# Patient Record
Sex: FEMALE | Race: WHITE | NOT HISPANIC OR LATINO
[De-identification: names, ages, dates, MRNs, and addresses within clinical notes are randomized per-mention and may not be internally consistent; named-entity substitution may affect disease eponyms.]

---

## 2020-04-01 ENCOUNTER — APPOINTMENT (OUTPATIENT)
Dept: SURGERY | Facility: CLINIC | Age: 80
End: 2020-04-01
Payer: MEDICARE

## 2020-04-01 VITALS
BODY MASS INDEX: 24.03 KG/M2 | OXYGEN SATURATION: 97 % | DIASTOLIC BLOOD PRESSURE: 89 MMHG | TEMPERATURE: 98.4 F | HEART RATE: 64 BPM | SYSTOLIC BLOOD PRESSURE: 159 MMHG | HEIGHT: 65.3 IN | WEIGHT: 146 LBS

## 2020-04-01 DIAGNOSIS — Z82.49 FAMILY HISTORY OF ISCHEMIC HEART DISEASE AND OTHER DISEASES OF THE CIRCULATORY SYSTEM: ICD-10-CM

## 2020-04-01 DIAGNOSIS — Z56.0 UNEMPLOYMENT, UNSPECIFIED: ICD-10-CM

## 2020-04-01 DIAGNOSIS — Z72.89 OTHER PROBLEMS RELATED TO LIFESTYLE: ICD-10-CM

## 2020-04-01 DIAGNOSIS — Z83.3 FAMILY HISTORY OF DIABETES MELLITUS: ICD-10-CM

## 2020-04-01 DIAGNOSIS — Z87.891 PERSONAL HISTORY OF NICOTINE DEPENDENCE: ICD-10-CM

## 2020-04-01 DIAGNOSIS — Z82.3 FAMILY HISTORY OF STROKE: ICD-10-CM

## 2020-04-01 PROCEDURE — 99203 OFFICE O/P NEW LOW 30 MIN: CPT

## 2020-04-01 RX ORDER — PSYLLIUM HUSK 0.4 G
CAPSULE ORAL
Refills: 0 | Status: ACTIVE | COMMUNITY

## 2020-04-01 RX ORDER — CETIRIZINE HCL 10 MG
TABLET ORAL
Refills: 0 | Status: ACTIVE | COMMUNITY

## 2020-04-01 RX ORDER — MULTIVITAMIN
TABLET ORAL
Refills: 0 | Status: ACTIVE | COMMUNITY

## 2020-04-01 RX ORDER — VITAMIN B COMPLEX
CAPSULE ORAL
Refills: 0 | Status: ACTIVE | COMMUNITY

## 2020-04-01 RX ORDER — LEVOTHYROXINE SODIUM 0.17 MG/1
TABLET ORAL
Refills: 0 | Status: ACTIVE | COMMUNITY

## 2020-04-01 RX ORDER — ATORVASTATIN CALCIUM 80 MG/1
TABLET, FILM COATED ORAL
Refills: 0 | Status: ACTIVE | COMMUNITY

## 2020-04-01 RX ORDER — PSEUDOEPHEDRINE HCL 30 MG
TABLET ORAL
Refills: 0 | Status: ACTIVE | COMMUNITY

## 2020-04-01 RX ORDER — CALCIUM CARBONATE/VITAMIN D3 600 MG-10
TABLET ORAL
Refills: 0 | Status: ACTIVE | COMMUNITY

## 2020-04-01 RX ORDER — AMLODIPINE BESYLATE 5 MG/1
TABLET ORAL
Refills: 0 | Status: ACTIVE | COMMUNITY

## 2020-04-01 RX ORDER — ASCORBIC ACID 500 MG
TABLET ORAL
Refills: 0 | Status: ACTIVE | COMMUNITY

## 2020-04-01 RX ORDER — ASPIRIN 81 MG
81 TABLET,CHEWABLE ORAL
Refills: 0 | Status: ACTIVE | COMMUNITY

## 2020-04-01 SDOH — ECONOMIC STABILITY - INCOME SECURITY: UNEMPLOYMENT, UNSPECIFIED: Z56.0

## 2020-04-01 NOTE — ASSESSMENT
[FreeTextEntry1] : 80 year old female with prior exlap and recent likely infra-umbilical fungal infection, which is resolved. There is no evidence of infection, hernia or other acute pathology at this time.

## 2020-04-01 NOTE — PHYSICAL EXAM
[Respiratory Effort] : normal respiratory effort [Normal Rate and Rhythm] : normal rate and rhythm [Alert] : alert [Oriented to Person] : oriented to person [Oriented to Place] : oriented to place [Oriented to Time] : oriented to time [Abdominal Masses] : No abdominal masses [Abdomen Tenderness] : ~T ~M No abdominal tenderness [Skin Ulcer] : no ulcer [Skin Induration] : no induration [de-identified] : Well developed, well nourished adult, in NAD. [de-identified] : NC/AT, PERRL, EOMI, sclera anicteric [de-identified] : Soft, nontender, nondistended. No masses, rebound or guarding. Lower midline incision well healed, without erythema or drainage.  No hernias palpable.\par  [de-identified] : Affect appropriate

## 2020-04-01 NOTE — REVIEW OF SYSTEMS
[As Noted in HPI] : as noted in HPI [Negative] : Heme/Lymph [Fever] : no fever [Chills] : no chills [Feeling Poorly] : not feeling poorly [Sore Throat] : no sore throat [Hoarseness] : no hoarseness [Chest Pain] : no chest pain [Palpitations] : no palpitations [Shortness Of Breath] : no shortness of breath [Cough] : no cough [SOB on Exertion] : no shortness of breath during exertion [Abdominal Pain] : no abdominal pain [Vomiting] : no vomiting [Constipation] : no constipation [Diarrhea] : no diarrhea [Dysuria] : no dysuria [Incontinence] : no incontinence [Skin Lesions] : no skin lesions [Skin Wound] : no skin wound [Itching] : no itching

## 2020-04-01 NOTE — HISTORY OF PRESENT ILLNESS
[de-identified] : Patient underwent an emergency exlap for a strangulated ventral hernia in 2012, developed a post-operative wound infection but otherwise recovered well.  She has been in her usual state of good health and has been mindful of over-exerting herself despite remaining active. \par Approximately two weeks ago she noted straw-colored staining on the waist of her underwear, which subsequently became bloody.  She contacted me via telephone, and she described an itchy and red area below her umbilicus, with some foul smell.  She denied fever or chills and no abdominal pain, and otherwise felt well.  I instructed her to clean the area with soap and water and to apply topical antifungal, which she states cleared up the redness, itching and discharge. She has had no further redness, pain, or discoloration or blood. \par She presently feels well, denies pain, fever or chills.  She has been having normal bowel movements and offers no additional complaints at this time. \par

## 2020-04-01 NOTE — PLAN
[FreeTextEntry1] : I instructed patient to call me immediately with any recurrence of symptoms or new symptoms. \par Patient's questions and concerns addressed to her satisfaction. \par

## 2020-09-23 ENCOUNTER — APPOINTMENT (OUTPATIENT)
Dept: SURGERY | Facility: CLINIC | Age: 80
End: 2020-09-23

## 2020-09-30 ENCOUNTER — APPOINTMENT (OUTPATIENT)
Dept: SURGERY | Facility: CLINIC | Age: 80
End: 2020-09-30
Payer: MEDICARE

## 2020-09-30 VITALS
BODY MASS INDEX: 23.82 KG/M2 | HEART RATE: 73 BPM | HEIGHT: 65 IN | DIASTOLIC BLOOD PRESSURE: 74 MMHG | TEMPERATURE: 97.9 F | SYSTOLIC BLOOD PRESSURE: 182 MMHG | WEIGHT: 143 LBS

## 2020-09-30 PROCEDURE — 99213 OFFICE O/P EST LOW 20 MIN: CPT

## 2020-09-30 NOTE — REVIEW OF SYSTEMS
[As Noted in HPI] : as noted in HPI [Negative] : Heme/Lymph [Fever] : no fever [Chills] : no chills [Feeling Poorly] : not feeling poorly [Feeling Tired] : not feeling tired [Sore Throat] : no sore throat [Hoarseness] : no hoarseness [Chest Pain] : no chest pain [Palpitations] : no palpitations [Shortness Of Breath] : no shortness of breath [Cough] : no cough [SOB on Exertion] : no shortness of breath during exertion [Abdominal Pain] : no abdominal pain [Vomiting] : no vomiting [Constipation] : no constipation [Diarrhea] : no diarrhea [Dysuria] : no dysuria [Incontinence] : no incontinence [Skin Lesions] : no skin lesions [Skin Wound] : no skin wound [Itching] : no itching

## 2020-09-30 NOTE — HISTORY OF PRESENT ILLNESS
[de-identified] : Patient underwent an emergency exlap for a strangulated ventral hernia in 2012, developed a post-operative wound infection but otherwise recovered well.  She has been in her usual state of good health and has been mindful of over-exerting herself despite remaining active. \par She is here today because in June, she again noted a small amount of straw-colored staining on the waist of her underwear, which resolved after one day.  She denied fever or chills and no abdominal pain, and otherwise felt well.  She again experienced a similar episode in September, lasting one day, and cleared by the next morning.  She states she has had no further redness, itching and discharge. She has had no further redness, pain, or discoloration or blood. \par She presently feels well, denies pain, fever or chills.  She has been having normal bowel movements and offers no additional complaints at this time. \par  [de-identified] : Patient reports no interval changes to medications, medical history or overall health status.\par

## 2020-09-30 NOTE — ASSESSMENT
[FreeTextEntry1] : Likely recurrence of self-limited fungal infection, similar to what she had in the past.  There is no evidence of infection of any kind presently. \par

## 2020-09-30 NOTE — PHYSICAL EXAM
[Respiratory Effort] : normal respiratory effort [Normal Rate and Rhythm] : normal rate and rhythm [Alert] : alert [Oriented to Person] : oriented to person [Oriented to Place] : oriented to place [Oriented to Time] : oriented to time [Abdominal Masses] : No abdominal masses [Abdomen Tenderness] : ~T ~M No abdominal tenderness [Skin Ulcer] : no ulcer [Skin Induration] : no induration [de-identified] : Well developed, well nourished adult, in NAD. [de-identified] : NC/AT, PERRL, EOMI, sclera anicteric [de-identified] : Soft, nontender, nondistended. No masses, rebound or guarding. Lower midline incision well healed, without erythema or drainage.  No hernias palpable.  Umbilicus without erythema, drainage or skin changes.  \par  [de-identified] : Affect appropriate

## 2021-07-08 ENCOUNTER — INPATIENT (INPATIENT)
Facility: HOSPITAL | Age: 81
LOS: 3 days | Discharge: HOME | DRG: 440 | End: 2021-07-12
Attending: HOSPITALIST | Admitting: HOSPITALIST
Payer: MEDICARE

## 2021-07-08 VITALS
RESPIRATION RATE: 18 BRPM | TEMPERATURE: 98 F | HEART RATE: 66 BPM | WEIGHT: 145.51 LBS | OXYGEN SATURATION: 98 % | DIASTOLIC BLOOD PRESSURE: 78 MMHG | SYSTOLIC BLOOD PRESSURE: 157 MMHG

## 2021-07-08 DIAGNOSIS — K85.90 ACUTE PANCREATITIS WITHOUT NECROSIS OR INFECTION, UNSPECIFIED: ICD-10-CM

## 2021-07-08 LAB
ALBUMIN SERPL ELPH-MCNC: 4.3 G/DL — SIGNIFICANT CHANGE UP (ref 3.5–5)
ALP SERPL-CCNC: 111 U/L — SIGNIFICANT CHANGE UP (ref 40–120)
ALT FLD-CCNC: 21 U/L DA — SIGNIFICANT CHANGE UP (ref 10–60)
ANION GAP SERPL CALC-SCNC: 6 MMOL/L — SIGNIFICANT CHANGE UP (ref 5–17)
APPEARANCE UR: CLEAR — SIGNIFICANT CHANGE UP
AST SERPL-CCNC: 34 U/L — SIGNIFICANT CHANGE UP (ref 10–40)
BASOPHILS # BLD AUTO: 0.07 K/UL — SIGNIFICANT CHANGE UP (ref 0–0.2)
BASOPHILS NFR BLD AUTO: 0.7 % — SIGNIFICANT CHANGE UP (ref 0–2)
BILIRUB SERPL-MCNC: 0.6 MG/DL — SIGNIFICANT CHANGE UP (ref 0.2–1.2)
BILIRUB UR-MCNC: NEGATIVE — SIGNIFICANT CHANGE UP
BUN SERPL-MCNC: 23 MG/DL — HIGH (ref 7–18)
CALCIUM SERPL-MCNC: 9.5 MG/DL — SIGNIFICANT CHANGE UP (ref 8.4–10.5)
CHLORIDE SERPL-SCNC: 106 MMOL/L — SIGNIFICANT CHANGE UP (ref 96–108)
CO2 SERPL-SCNC: 26 MMOL/L — SIGNIFICANT CHANGE UP (ref 22–31)
COLOR SPEC: YELLOW — SIGNIFICANT CHANGE UP
CREAT SERPL-MCNC: 1.09 MG/DL — SIGNIFICANT CHANGE UP (ref 0.5–1.3)
DIFF PNL FLD: ABNORMAL
EOSINOPHIL # BLD AUTO: 0.06 K/UL — SIGNIFICANT CHANGE UP (ref 0–0.5)
EOSINOPHIL NFR BLD AUTO: 0.6 % — SIGNIFICANT CHANGE UP (ref 0–6)
ETHANOL SERPL-MCNC: 5 MG/DL — SIGNIFICANT CHANGE UP (ref 0–10)
GLUCOSE SERPL-MCNC: 150 MG/DL — HIGH (ref 70–99)
GLUCOSE UR QL: NEGATIVE — SIGNIFICANT CHANGE UP
HCT VFR BLD CALC: 42.8 % — SIGNIFICANT CHANGE UP (ref 34.5–45)
HGB BLD-MCNC: 14 G/DL — SIGNIFICANT CHANGE UP (ref 11.5–15.5)
IMM GRANULOCYTES NFR BLD AUTO: 0.5 % — SIGNIFICANT CHANGE UP (ref 0–1.5)
KETONES UR-MCNC: ABNORMAL
LACTATE SERPL-SCNC: 1 MMOL/L — SIGNIFICANT CHANGE UP (ref 0.7–2)
LEUKOCYTE ESTERASE UR-ACNC: ABNORMAL
LIDOCAIN IGE QN: 3747 U/L — HIGH (ref 73–393)
LYMPHOCYTES # BLD AUTO: 0.9 K/UL — LOW (ref 1–3.3)
LYMPHOCYTES # BLD AUTO: 8.4 % — LOW (ref 13–44)
MCHC RBC-ENTMCNC: 30 PG — SIGNIFICANT CHANGE UP (ref 27–34)
MCHC RBC-ENTMCNC: 32.7 GM/DL — SIGNIFICANT CHANGE UP (ref 32–36)
MCV RBC AUTO: 91.8 FL — SIGNIFICANT CHANGE UP (ref 80–100)
MONOCYTES # BLD AUTO: 0.39 K/UL — SIGNIFICANT CHANGE UP (ref 0–0.9)
MONOCYTES NFR BLD AUTO: 3.7 % — SIGNIFICANT CHANGE UP (ref 2–14)
NEUTROPHILS # BLD AUTO: 9.21 K/UL — HIGH (ref 1.8–7.4)
NEUTROPHILS NFR BLD AUTO: 86.1 % — HIGH (ref 43–77)
NITRITE UR-MCNC: NEGATIVE — SIGNIFICANT CHANGE UP
NRBC # BLD: 0 /100 WBCS — SIGNIFICANT CHANGE UP (ref 0–0)
PH UR: 6 — SIGNIFICANT CHANGE UP (ref 5–8)
PLATELET # BLD AUTO: 236 K/UL — SIGNIFICANT CHANGE UP (ref 150–400)
POTASSIUM SERPL-MCNC: 4.3 MMOL/L — SIGNIFICANT CHANGE UP (ref 3.5–5.3)
POTASSIUM SERPL-SCNC: 4.3 MMOL/L — SIGNIFICANT CHANGE UP (ref 3.5–5.3)
PROT SERPL-MCNC: 8.3 G/DL — SIGNIFICANT CHANGE UP (ref 6–8.3)
PROT UR-MCNC: 100
RBC # BLD: 4.66 M/UL — SIGNIFICANT CHANGE UP (ref 3.8–5.2)
RBC # FLD: 12.3 % — SIGNIFICANT CHANGE UP (ref 10.3–14.5)
SODIUM SERPL-SCNC: 138 MMOL/L — SIGNIFICANT CHANGE UP (ref 135–145)
SP GR SPEC: 1.02 — SIGNIFICANT CHANGE UP (ref 1.01–1.02)
TRIGL SERPL-MCNC: 74 MG/DL — SIGNIFICANT CHANGE UP
TROPONIN I SERPL-MCNC: <0.015 NG/ML — SIGNIFICANT CHANGE UP (ref 0–0.04)
UROBILINOGEN FLD QL: NEGATIVE — SIGNIFICANT CHANGE UP
WBC # BLD: 10.68 K/UL — HIGH (ref 3.8–10.5)
WBC # FLD AUTO: 10.68 K/UL — HIGH (ref 3.8–10.5)

## 2021-07-08 PROCEDURE — 99285 EMERGENCY DEPT VISIT HI MDM: CPT

## 2021-07-08 PROCEDURE — 76705 ECHO EXAM OF ABDOMEN: CPT | Mod: 26

## 2021-07-08 PROCEDURE — 93010 ELECTROCARDIOGRAM REPORT: CPT

## 2021-07-08 PROCEDURE — 99222 1ST HOSP IP/OBS MODERATE 55: CPT

## 2021-07-08 RX ORDER — ONDANSETRON 8 MG/1
4 TABLET, FILM COATED ORAL ONCE
Refills: 0 | Status: COMPLETED | OUTPATIENT
Start: 2021-07-08 | End: 2021-07-08

## 2021-07-08 RX ORDER — MORPHINE SULFATE 50 MG/1
4 CAPSULE, EXTENDED RELEASE ORAL ONCE
Refills: 0 | Status: DISCONTINUED | OUTPATIENT
Start: 2021-07-08 | End: 2021-07-08

## 2021-07-08 RX ORDER — SODIUM CHLORIDE 9 MG/ML
1000 INJECTION INTRAMUSCULAR; INTRAVENOUS; SUBCUTANEOUS ONCE
Refills: 0 | Status: COMPLETED | OUTPATIENT
Start: 2021-07-08 | End: 2021-07-08

## 2021-07-08 RX ADMIN — SODIUM CHLORIDE 1000 MILLILITER(S): 9 INJECTION INTRAMUSCULAR; INTRAVENOUS; SUBCUTANEOUS at 21:08

## 2021-07-08 RX ADMIN — SODIUM CHLORIDE 1000 MILLILITER(S): 9 INJECTION INTRAMUSCULAR; INTRAVENOUS; SUBCUTANEOUS at 20:43

## 2021-07-08 RX ADMIN — ONDANSETRON 4 MILLIGRAM(S): 8 TABLET, FILM COATED ORAL at 18:11

## 2021-07-08 RX ADMIN — MORPHINE SULFATE 4 MILLIGRAM(S): 50 CAPSULE, EXTENDED RELEASE ORAL at 18:11

## 2021-07-08 RX ADMIN — MORPHINE SULFATE 4 MILLIGRAM(S): 50 CAPSULE, EXTENDED RELEASE ORAL at 19:36

## 2021-07-08 RX ADMIN — MORPHINE SULFATE 4 MILLIGRAM(S): 50 CAPSULE, EXTENDED RELEASE ORAL at 21:08

## 2021-07-08 NOTE — H&P ADULT - MUSCULOSKELETAL
normal/ROM intact/no joint swelling/no joint erythema/no joint warmth/no calf tenderness/normal strength detailed exam

## 2021-07-08 NOTE — ED PROVIDER NOTE - CLINICAL SUMMARY MEDICAL DECISION MAKING FREE TEXT BOX
82 y/o woman, h/o abdominal hernia repair, c/o diffuse abdominal pain since 4 am today, with vomiting once.  No fever/chills/diarrhea/constipation/dysuria--Labs, IVF, pain control, ultrasound, reassess.

## 2021-07-08 NOTE — H&P ADULT - ASSESSMENT
Patient is a 81 year old, ambulates independently, AAOx3, from home, w/ PMH of CVA, HTN, hypothyroidism, and MGUS, p/w abdominal pain x2d. Admitted for acute pancreatitis

## 2021-07-08 NOTE — H&P ADULT - ATTENDING COMMENTS
Pt seen and examined.  Case discussed with MAR.  81 year old woman with 1 day of nausea, vomiting and epigastric pain.    Vital Signs Last 24 Hrs  T(C): 36.1 (2021 19:41), Max: 36.6 (2021 16:18)  T(F): 97 (2021 19:41), Max: 97.8 (2021 16:18)  HR: 59 (2021 19:41) (59 - 66)  BP: 183/78 (2021 19:41) (157/78 - 183/78)  RR: 18 (2021 19:41) (18 - 18)  SpO2: 96% (2021 19:41) (96% - 98%)      Labs                        14.0   10.68 )-----------( 236      ( 2021 17:24 )             42.8     07-08    138  |  106  |  23<H>  ----------------------------<  150<H>  4.3   |  26  |  1.09    Ca    9.5      2021 17:24  TPro  8.3  /  Alb  4.3  /  TBili  0.6  /  DBili  x   /  AST  34  /  ALT  21  /  AlkPhos  111  07-08    Lipase - 3747    Urinalysis Basic - ( 2021 20:56 )    Color: Yellow / Appearance: Clear / S.020 / pH: x  Gluc: x / Ketone: Small  / Bili: Negative / Urobili: Negative   Blood: x / Protein: 100 / Nitrite: Negative   Leuk Esterase: Moderate / RBC: 5-10 /HPF / WBC 26-50 /HPF   Sq Epi: x / Non Sq Epi: Few /HPF / Bacteria: Moderate /HPF    RUQ U/S - unremarkable    Impression  - Acute uncomplicated pancreatitis  - UTI    - Plan  Admit to Medicine   Pain control   Aggressive IVF hydration with isotonic fluids- lactated ringer's  Keep NPO  Monitor chemistry   Empiric IV antibiotics with ceftriaxone 1g daily; check urine culture Pt seen and examined.  Case discussed with MAR.  81 year old woman with 1 day of nausea, vomiting and epigastric pain.    Vital Signs Last 24 Hrs  T(C): 36.1 (2021 19:41), Max: 36.6 (2021 16:18)  T(F): 97 (2021 19:41), Max: 97.8 (2021 16:18)  HR: 59 (2021 19:41) (59 - 66)  BP: 183/78 (2021 19:41) (157/78 - 183/78)  RR: 18 (2021 19:41) (18 - 18)  SpO2: 96% (2021 19:41) (96% - 98%)    Exam   as above    Labs                        14.0   10.68 )-----------( 236      ( 2021 17:24 )             42.8     07-    138  |  106  |  23<H>  ----------------------------<  150<H>  4.3   |  26  |  1.09    Ca    9.5      2021 17:24  TPro  8.3  /  Alb  4.3  /  TBili  0.6  /  DBili  x   /  AST  34  /  ALT  21  /  AlkPhos  111  07-08    Lipase - 3747    Urinalysis Basic - ( 2021 20:56 )    Color: Yellow / Appearance: Clear / S.020 / pH: x  Gluc: x / Ketone: Small  / Bili: Negative / Urobili: Negative   Blood: x / Protein: 100 / Nitrite: Negative   Leuk Esterase: Moderate / RBC: 5-10 /HPF / WBC 26-50 /HPF   Sq Epi: x / Non Sq Epi: Few /HPF / Bacteria: Moderate /HPF    RUQ U/S - unremarkable    Impression  - Acute uncomplicated pancreatitis  - Asymptomatic bacteruria    - Plan  Admit to Medicine   Pain control   Aggressive IVF hydration with isotonic fluids- lactated ringer's  Keep NPO  Monitor chemistry

## 2021-07-08 NOTE — H&P ADULT - PROBLEM SELECTOR PLAN 1
- p/w abdominal pain  - possible due to Amlodipine or UTI  - VS wnl  - WBC 10.7  - LFTs normal  - tG  - lipase 3747  - US abd no signs of acute cholecystitis  - s/p 1L LR  - c/w NPO, IVF, pain management  - f/u tG, lipid panel - p/w abdominal pain  - possible due to Amlodipine vs UTI  - VS wnl  - WBC 10.7  - LFTs normal  - tG  - lipase 3747  - US abd no signs of acute cholecystitis  - s/p 1L LR  - c/w NPO, IVF, pain management  - f/u tG, lipid panel

## 2021-07-08 NOTE — ED ADULT NURSE NOTE - OBJECTIVE STATEMENT
pt is here for abdominal pain.  pt stated that "too much gas in stomach", abdominal pain for 1 day, denied fever or chills, c/o nausea, denied chest pain or sob, no distress noted at this time.

## 2021-07-08 NOTE — H&P ADULT - NSICDXPASTMEDICALHX_GEN_ALL_CORE_FT
PAST MEDICAL HISTORY:  Cerebrovascular accident (CVA)     HTN (hypertension)     Hypothyroidism

## 2021-07-08 NOTE — H&P ADULT - PROBLEM SELECTOR PLAN 2
- noted to have positive U/A  - asxs  - c/w ceftriaxone  - f/u Ucx, Bcx - noted to have positive U/A  - asxs  - hold abx  - f/u Ucx, Bcx

## 2021-07-08 NOTE — H&P ADULT - PROBLEM SELECTOR PLAN 4
- h/o HTN, on amlodipine 5mg and lisinopril 40mg at home  - c/w lisinopril 40mg qd  - hold amlodipine given possible etiology of pancreatitis

## 2021-07-08 NOTE — ED PROVIDER NOTE - OBJECTIVE STATEMENT
80 y/o woman, h/o abdominal hernia repair, c/o diffuse abdominal pain since 4 am today, with vomiting once.  No fever/chills/diarrhea/constipation/dysuria.  She admits to drinking alcohol occasionally, socially, but denies heavy or frequent consumption.

## 2021-07-08 NOTE — H&P ADULT - HISTORY OF PRESENT ILLNESS
Patient is a 81 year old, ambulates independently, AAOx3, from home, w/ PMH of CVA, HTN, hypothyroidism, and MGUS, p/w abdominal pain x2d. Initially she felt increased abdominal pressure from gas, but it developed to sharp, intermittent, 3/10, non-radiating, mid-epigastric abdominal pain, which worsened with oral intake. Patient endorses nausea and NBNB vomiting x4-5x. She denies fever, chills, chest pain, sob, urinary or bowel movement changes.

## 2021-07-09 ENCOUNTER — TRANSCRIPTION ENCOUNTER (OUTPATIENT)
Age: 81
End: 2021-07-09

## 2021-07-09 DIAGNOSIS — N39.0 URINARY TRACT INFECTION, SITE NOT SPECIFIED: ICD-10-CM

## 2021-07-09 DIAGNOSIS — I63.9 CEREBRAL INFARCTION, UNSPECIFIED: ICD-10-CM

## 2021-07-09 DIAGNOSIS — E03.9 HYPOTHYROIDISM, UNSPECIFIED: ICD-10-CM

## 2021-07-09 DIAGNOSIS — K85.90 ACUTE PANCREATITIS WITHOUT NECROSIS OR INFECTION, UNSPECIFIED: ICD-10-CM

## 2021-07-09 DIAGNOSIS — Z98.890 OTHER SPECIFIED POSTPROCEDURAL STATES: Chronic | ICD-10-CM

## 2021-07-09 DIAGNOSIS — D47.2 MONOCLONAL GAMMOPATHY: ICD-10-CM

## 2021-07-09 DIAGNOSIS — Z29.9 ENCOUNTER FOR PROPHYLACTIC MEASURES, UNSPECIFIED: ICD-10-CM

## 2021-07-09 DIAGNOSIS — R82.71 BACTERIURIA: ICD-10-CM

## 2021-07-09 DIAGNOSIS — I10 ESSENTIAL (PRIMARY) HYPERTENSION: ICD-10-CM

## 2021-07-09 LAB
A1C WITH ESTIMATED AVERAGE GLUCOSE RESULT: 5.5 % — SIGNIFICANT CHANGE UP (ref 4–5.6)
ALBUMIN SERPL ELPH-MCNC: 3.9 G/DL — SIGNIFICANT CHANGE UP (ref 3.5–5)
ALP SERPL-CCNC: 99 U/L — SIGNIFICANT CHANGE UP (ref 40–120)
ALT FLD-CCNC: 20 U/L DA — SIGNIFICANT CHANGE UP (ref 10–60)
ANION GAP SERPL CALC-SCNC: 5 MMOL/L — SIGNIFICANT CHANGE UP (ref 5–17)
AST SERPL-CCNC: 26 U/L — SIGNIFICANT CHANGE UP (ref 10–40)
BASOPHILS # BLD AUTO: 0.03 K/UL — SIGNIFICANT CHANGE UP (ref 0–0.2)
BASOPHILS NFR BLD AUTO: 0.4 % — SIGNIFICANT CHANGE UP (ref 0–2)
BILIRUB SERPL-MCNC: 0.5 MG/DL — SIGNIFICANT CHANGE UP (ref 0.2–1.2)
BUN SERPL-MCNC: 26 MG/DL — HIGH (ref 7–18)
CALCIUM SERPL-MCNC: 8.6 MG/DL — SIGNIFICANT CHANGE UP (ref 8.4–10.5)
CHLORIDE SERPL-SCNC: 107 MMOL/L — SIGNIFICANT CHANGE UP (ref 96–108)
CHOLEST SERPL-MCNC: 139 MG/DL — SIGNIFICANT CHANGE UP
CO2 SERPL-SCNC: 32 MMOL/L — HIGH (ref 22–31)
CREAT SERPL-MCNC: 1.21 MG/DL — SIGNIFICANT CHANGE UP (ref 0.5–1.3)
EOSINOPHIL # BLD AUTO: 0.01 K/UL — SIGNIFICANT CHANGE UP (ref 0–0.5)
EOSINOPHIL NFR BLD AUTO: 0.1 % — SIGNIFICANT CHANGE UP (ref 0–6)
ESTIMATED AVERAGE GLUCOSE: 111 MG/DL — SIGNIFICANT CHANGE UP (ref 68–114)
GLUCOSE SERPL-MCNC: 146 MG/DL — HIGH (ref 70–99)
HCT VFR BLD CALC: 38.8 % — SIGNIFICANT CHANGE UP (ref 34.5–45)
HDLC SERPL-MCNC: 70 MG/DL — SIGNIFICANT CHANGE UP
HGB BLD-MCNC: 12.4 G/DL — SIGNIFICANT CHANGE UP (ref 11.5–15.5)
IMM GRANULOCYTES NFR BLD AUTO: 1 % — SIGNIFICANT CHANGE UP (ref 0–1.5)
LIPID PNL WITH DIRECT LDL SERPL: 57 MG/DL — SIGNIFICANT CHANGE UP
LYMPHOCYTES # BLD AUTO: 0.6 K/UL — LOW (ref 1–3.3)
LYMPHOCYTES # BLD AUTO: 7.5 % — LOW (ref 13–44)
MAGNESIUM SERPL-MCNC: 2 MG/DL — SIGNIFICANT CHANGE UP (ref 1.6–2.6)
MCHC RBC-ENTMCNC: 30 PG — SIGNIFICANT CHANGE UP (ref 27–34)
MCHC RBC-ENTMCNC: 32 GM/DL — SIGNIFICANT CHANGE UP (ref 32–36)
MCV RBC AUTO: 93.7 FL — SIGNIFICANT CHANGE UP (ref 80–100)
MONOCYTES # BLD AUTO: 0.5 K/UL — SIGNIFICANT CHANGE UP (ref 0–0.9)
MONOCYTES NFR BLD AUTO: 6.3 % — SIGNIFICANT CHANGE UP (ref 2–14)
NEUTROPHILS # BLD AUTO: 6.77 K/UL — SIGNIFICANT CHANGE UP (ref 1.8–7.4)
NEUTROPHILS NFR BLD AUTO: 84.7 % — HIGH (ref 43–77)
NON HDL CHOLESTEROL: 69 MG/DL — SIGNIFICANT CHANGE UP
NRBC # BLD: 0 /100 WBCS — SIGNIFICANT CHANGE UP (ref 0–0)
PHOSPHATE SERPL-MCNC: 5.9 MG/DL — HIGH (ref 2.5–4.5)
PLATELET # BLD AUTO: 203 K/UL — SIGNIFICANT CHANGE UP (ref 150–400)
POTASSIUM SERPL-MCNC: 3.8 MMOL/L — SIGNIFICANT CHANGE UP (ref 3.5–5.3)
POTASSIUM SERPL-SCNC: 3.8 MMOL/L — SIGNIFICANT CHANGE UP (ref 3.5–5.3)
PROT SERPL-MCNC: 7.5 G/DL — SIGNIFICANT CHANGE UP (ref 6–8.3)
RBC # BLD: 4.14 M/UL — SIGNIFICANT CHANGE UP (ref 3.8–5.2)
RBC # FLD: 12.5 % — SIGNIFICANT CHANGE UP (ref 10.3–14.5)
SARS-COV-2 RNA SPEC QL NAA+PROBE: SIGNIFICANT CHANGE UP
SODIUM SERPL-SCNC: 144 MMOL/L — SIGNIFICANT CHANGE UP (ref 135–145)
TRIGL SERPL-MCNC: 60 MG/DL — SIGNIFICANT CHANGE UP
TSH SERPL-MCNC: 0.73 UU/ML — SIGNIFICANT CHANGE UP (ref 0.34–4.82)
WBC # BLD: 7.99 K/UL — SIGNIFICANT CHANGE UP (ref 3.8–10.5)
WBC # FLD AUTO: 7.99 K/UL — SIGNIFICANT CHANGE UP (ref 3.8–10.5)

## 2021-07-09 PROCEDURE — 99233 SBSQ HOSP IP/OBS HIGH 50: CPT | Mod: GC

## 2021-07-09 RX ORDER — ONDANSETRON 8 MG/1
4 TABLET, FILM COATED ORAL EVERY 8 HOURS
Refills: 0 | Status: DISCONTINUED | OUTPATIENT
Start: 2021-07-09 | End: 2021-07-12

## 2021-07-09 RX ORDER — AMLODIPINE BESYLATE 2.5 MG/1
1 TABLET ORAL
Qty: 0 | Refills: 0 | DISCHARGE

## 2021-07-09 RX ORDER — HYDROMORPHONE HYDROCHLORIDE 2 MG/ML
1 INJECTION INTRAMUSCULAR; INTRAVENOUS; SUBCUTANEOUS
Refills: 0 | Status: DISCONTINUED | OUTPATIENT
Start: 2021-07-09 | End: 2021-07-10

## 2021-07-09 RX ORDER — PANTOPRAZOLE SODIUM 20 MG/1
40 TABLET, DELAYED RELEASE ORAL DAILY
Refills: 0 | Status: DISCONTINUED | OUTPATIENT
Start: 2021-07-09 | End: 2021-07-11

## 2021-07-09 RX ORDER — AMLODIPINE BESYLATE 2.5 MG/1
5 TABLET ORAL ONCE
Refills: 0 | Status: COMPLETED | OUTPATIENT
Start: 2021-07-09 | End: 2021-07-09

## 2021-07-09 RX ORDER — ASPIRIN/CALCIUM CARB/MAGNESIUM 324 MG
1 TABLET ORAL
Qty: 0 | Refills: 0 | DISCHARGE

## 2021-07-09 RX ORDER — ATORVASTATIN CALCIUM 80 MG/1
1 TABLET, FILM COATED ORAL
Qty: 0 | Refills: 0 | DISCHARGE

## 2021-07-09 RX ORDER — LISINOPRIL 2.5 MG/1
1 TABLET ORAL
Qty: 0 | Refills: 0 | DISCHARGE

## 2021-07-09 RX ORDER — ONDANSETRON 8 MG/1
4 TABLET, FILM COATED ORAL ONCE
Refills: 0 | Status: COMPLETED | OUTPATIENT
Start: 2021-07-09 | End: 2021-07-09

## 2021-07-09 RX ORDER — SODIUM CHLORIDE 9 MG/ML
1000 INJECTION, SOLUTION INTRAVENOUS
Refills: 0 | Status: DISCONTINUED | OUTPATIENT
Start: 2021-07-09 | End: 2021-07-10

## 2021-07-09 RX ORDER — LEVOTHYROXINE SODIUM 125 MCG
1 TABLET ORAL
Qty: 0 | Refills: 0 | DISCHARGE

## 2021-07-09 RX ORDER — KETOROLAC TROMETHAMINE 30 MG/ML
30 SYRINGE (ML) INJECTION EVERY 6 HOURS
Refills: 0 | Status: DISCONTINUED | OUTPATIENT
Start: 2021-07-09 | End: 2021-07-12

## 2021-07-09 RX ORDER — ENOXAPARIN SODIUM 100 MG/ML
40 INJECTION SUBCUTANEOUS ONCE
Refills: 0 | Status: COMPLETED | OUTPATIENT
Start: 2021-07-09 | End: 2021-07-09

## 2021-07-09 RX ADMIN — Medication 30 MILLIGRAM(S): at 02:42

## 2021-07-09 RX ADMIN — SODIUM CHLORIDE 200 MILLILITER(S): 9 INJECTION, SOLUTION INTRAVENOUS at 21:13

## 2021-07-09 RX ADMIN — AMLODIPINE BESYLATE 5 MILLIGRAM(S): 2.5 TABLET ORAL at 19:03

## 2021-07-09 RX ADMIN — ONDANSETRON 4 MILLIGRAM(S): 8 TABLET, FILM COATED ORAL at 03:50

## 2021-07-09 RX ADMIN — MORPHINE SULFATE 4 MILLIGRAM(S): 50 CAPSULE, EXTENDED RELEASE ORAL at 02:42

## 2021-07-09 RX ADMIN — Medication 30 MILLIGRAM(S): at 02:41

## 2021-07-09 RX ADMIN — HYDROMORPHONE HYDROCHLORIDE 1 MILLIGRAM(S): 2 INJECTION INTRAMUSCULAR; INTRAVENOUS; SUBCUTANEOUS at 02:41

## 2021-07-09 RX ADMIN — ONDANSETRON 4 MILLIGRAM(S): 8 TABLET, FILM COATED ORAL at 11:43

## 2021-07-09 RX ADMIN — PANTOPRAZOLE SODIUM 40 MILLIGRAM(S): 20 TABLET, DELAYED RELEASE ORAL at 11:37

## 2021-07-09 RX ADMIN — HYDROMORPHONE HYDROCHLORIDE 1 MILLIGRAM(S): 2 INJECTION INTRAMUSCULAR; INTRAVENOUS; SUBCUTANEOUS at 02:42

## 2021-07-09 RX ADMIN — ENOXAPARIN SODIUM 40 MILLIGRAM(S): 100 INJECTION SUBCUTANEOUS at 11:37

## 2021-07-09 RX ADMIN — SODIUM CHLORIDE 200 MILLILITER(S): 9 INJECTION, SOLUTION INTRAVENOUS at 04:51

## 2021-07-09 NOTE — PROGRESS NOTE ADULT - PROBLEM SELECTOR PLAN 5
- h/o hypothyroidism, on synthroid 100mcg at home  - c/w home med  - f/u TSH - h/o hypothyroidism, on synthroid 100mcg at home  - c/w home med  - TSH 0.73

## 2021-07-09 NOTE — PROGRESS NOTE ADULT - PROBLEM SELECTOR PLAN 1
- p/w abdominal pain  - possible due to Amlodipine vs UTI  - VS wnl  - WBC 10.7  - LFTs normal  - tG  - lipase 3747  - US abd no signs of acute cholecystitis  - s/p 1L LR  - c/w NPO, IVF, pain management  - f/u tG, lipid panel - p/w epigastric pain  - Idiopathic   - VS wnl  - WBC 10.7 -> 7.99  - LFTs normal  - tG 74 (normal)  - Elevated lipase 3747  - US abd no signs of acute cholecystitis  - s/p 1L LR  - c/w IVF, pain management  -Zofran 4mg IV Push PRN  - Protonix 40 IV qday  - Advance diet as tolerated if pt doesn't have nausea

## 2021-07-09 NOTE — PROGRESS NOTE ADULT - SUBJECTIVE AND OBJECTIVE BOX
PGY-1 Progress Note discussed with attending    PAGER #: [[phone]] TILL 5:00 PM  PLEASE CONTACT ON CALL TEAM:  - On Call Team (Please refer to Felecia) FROM 5:00 PM - 8:30PM  - Nightfloat Team FROM 8:30 -7:30 AM    CHIEF COMPLAINT & BRIEF HOSPITAL COURSE:    INTERVAL HPI/OVERNIGHT EVENTS:       REVIEW OF SYSTEMS:  CONSTITUTIONAL: No fever, weight loss, or fatigue  RESPIRATORY: No cough, wheezing, chills or hemoptysis; No shortness of breath  CARDIOVASCULAR: No chest pain, palpitations, dizziness, or leg swelling  GASTROINTESTINAL: No abdominal pain. No nausea, vomiting, or hematemesis; No diarrhea or constipation. No melena or hematochezia.  GENITOURINARY: No dysuria or hematuria, urinary frequency  NEUROLOGICAL: No headaches, memory loss, loss of strength, numbness, or tremors  SKIN: No itching, burning, rashes, or lesions     Vital Signs Last 24 Hrs  T(C): 36.5 (09 Jul 2021 04:51), Max: 37.2 (09 Jul 2021 00:03)  T(F): 97.7 (09 Jul 2021 04:51), Max: 98.9 (09 Jul 2021 00:03)  HR: 77 (09 Jul 2021 04:51) (59 - 77)  BP: 157/85 (09 Jul 2021 04:51) (157/78 - 183/78)  BP(mean): --  RR: 18 (09 Jul 2021 04:51) (18 - 18)  SpO2: 95% (09 Jul 2021 04:51) (95% - 98%)    PHYSICAL EXAMINATION:  GENERAL: NAD, well built  HEAD:  Atraumatic, Normocephalic  EYES:  conjunctiva and sclera clear  NECK: Supple, No JVD, Normal thyroid  CHEST/LUNG: Clear to auscultation. Clear to percussion bilaterally; No rales, rhonchi, wheezing, or rubs  HEART: Regular rate and rhythm; No murmurs, rubs, or gallops  ABDOMEN: Soft, Nontender, Nondistended; Bowel sounds present  NERVOUS SYSTEM:  Alert & Oriented X3,    EXTREMITIES:  2+ Peripheral Pulses, No clubbing, cyanosis, or edema  SKIN: warm dry                          12.4   7.99  )-----------( 203      ( 09 Jul 2021 06:26 )             38.8     07-09    144  |  107  |  26<H>  ----------------------------<  146<H>  3.8   |  32<H>  |  1.21    Ca    8.6      09 Jul 2021 06:26  Phos  5.9     07-09  Mg     2.0     07-09    TPro  7.5  /  Alb  3.9  /  TBili  0.5  /  DBili  x   /  AST  26  /  ALT  20  /  AlkPhos  99  07-09    LIVER FUNCTIONS - ( 09 Jul 2021 06:26 )  Alb: 3.9 g/dL / Pro: 7.5 g/dL / ALK PHOS: 99 U/L / ALT: 20 U/L DA / AST: 26 U/L / GGT: x           CARDIAC MARKERS ( 08 Jul 2021 17:24 )  <0.015 ng/mL / x     / x     / x     / x              CAPILLARY BLOOD GLUCOSE      RADIOLOGY & ADDITIONAL TESTS:                   PGY-1 Progress Note discussed with attending    PAGER #: [[phone]] TILL 5:00 PM  PLEASE CONTACT ON CALL TEAM:  - On Call Team (Please refer to Felecia) FROM 5:00 PM - 8:30PM  - Nightfloat Team FROM 8:30 -7:30 AM    CHIEF COMPLAINT & BRIEF HOSPITAL COURSE:    This is a 81 year old, ambulates independently, AAOx3, from home, w/ PMH of CVA, HTN, hypothyroidism, and MGUS, p/w abdominal pain x2d. Initially she felt increased abdominal pressure from gas, but it developed to sharp, intermittent, 3/10, non-radiating, mid-epigastric abdominal pain, which worsened with oral intake. Patient endorses nausea and NBNB vomiting x4-5x. The pt was admitted for acute pancreatitis. Lipase was found to be elevated. Pt denies any alcohol use, blood alcohol level was low. RUQ U/S showed no evidence of cholelithiasis or evidence of acute cholecystitis. TG level was low. Pt complained of a little nausea, when nausea decreases diet will be advanced as tolerated. All home meds will be reinstated, as none of her medications are associated with pancreatitis. UA was positive for infection, however pt complains of no urinary symptoms.    INTERVAL HPI/OVERNIGHT EVENTS:   Pt was admitted overnight.    REVIEW OF SYSTEMS:  CONSTITUTIONAL: No fever, weight loss, or fatigue  RESPIRATORY: No cough, wheezing, chills or hemoptysis; No shortness of breath  CARDIOVASCULAR: No chest pain, palpitations, dizziness, or leg swelling  GASTROINTESTINAL: Mild abdominal pain w/ nausea, however no vomiting, or hematemesis. No diarrhea or constipation. No melena or hematochezia.  GENITOURINARY: No dysuria or hematuria, urinary frequency  NEUROLOGICAL: No headaches, memory loss, loss of strength, numbness, or tremors  SKIN: No itching, burning, rashes, or lesions     Vital Signs Last 24 Hrs  T(C): 36.5 (09 Jul 2021 04:51), Max: 37.2 (09 Jul 2021 00:03)  T(F): 97.7 (09 Jul 2021 04:51), Max: 98.9 (09 Jul 2021 00:03)  HR: 77 (09 Jul 2021 04:51) (59 - 77)  BP: 157/85 (09 Jul 2021 04:51) (157/78 - 183/78)  BP(mean): --  RR: 18 (09 Jul 2021 04:51) (18 - 18)  SpO2: 95% (09 Jul 2021 04:51) (95% - 98%)    PHYSICAL EXAMINATION:  GENERAL: NAD, well built  HEAD:  Atraumatic, Normocephalic  EYES:  conjunctiva and sclera clear  NECK: Supple, No JVD, Normal thyroid  CHEST/LUNG: Clear to auscultation. Clear to percussion bilaterally; No rales, rhonchi, wheezing, or rubs  HEART: Regular rate and rhythm; No murmurs, rubs, or gallops  ABDOMEN: Hernia repair scar, mild pain on deep palpation in the mid epigastric area. Soft, Nondistended; Bowel sounds present  NERVOUS SYSTEM:  Alert & Oriented X3,    EXTREMITIES:  2+ Peripheral Pulses, No clubbing, cyanosis, or edema  SKIN: warm dry                          12.4   7.99  )-----------( 203      ( 09 Jul 2021 06:26 )             38.8     07-09    144  |  107  |  26<H>  ----------------------------<  146<H>  3.8   |  32<H>  |  1.21    Ca    8.6      09 Jul 2021 06:26  Phos  5.9     07-09  Mg     2.0     07-09    TPro  7.5  /  Alb  3.9  /  TBili  0.5  /  DBili  x   /  AST  26  /  ALT  20  /  AlkPhos  99  07-09    LIVER FUNCTIONS - ( 09 Jul 2021 06:26 )  Alb: 3.9 g/dL / Pro: 7.5 g/dL / ALK PHOS: 99 U/L / ALT: 20 U/L DA / AST: 26 U/L / GGT: x           CARDIAC MARKERS ( 08 Jul 2021 17:24 )  <0.015 ng/mL / x     / x     / x     / x              CAPILLARY BLOOD GLUCOSE      RADIOLOGY & ADDITIONAL TESTS:

## 2021-07-09 NOTE — DISCHARGE NOTE PROVIDER - HOSPITAL COURSE
81 year old, ambulates independently, AAOx3, from home, w/ PMH of CVA, HTN, hypothyroidism and MGUS, p/w mid epigastric abdominal pain x 2 days described as sharp, intermittent, 3/10, non-radiating, which worsened with oral intake. Pt admitted to medicine for acute pancreatitis, lipase elevated, RUQ U/S showed no evidence of cholelithiasis or evidence of acute cholecystitis, TG level was normal. Pancreatitis is most likely idiopathic vs autoimmune.   For hypertension, continued with home meds. For hypothyroidism, continued on home meds    Patient is stable for discharge and is advised to follow up with PCP as outpatient.  Please refer to patient's complete medical chart with documents for a full hospital course, for this is only a brief summary.   81 year old, ambulates independently, AAOx3, from home, w/ PMH of CVA, HTN, hypothyroidism and MGUS, p/w mid epigastric abdominal pain x 2 days described as sharp, intermittent, 3/10, non-radiating, which worsened with oral intake. Pt admitted to medicine for acute pancreatitis, lipase elevated, RUQ U/S showed no evidence of cholelithiasis or evidence of acute cholecystitis, TG level was normal. Pancreatitis is most likely idiopathic vs autoimmune.   For hypertension, continued with home meds. For hypothyroidism, continued on home meds    Patient is stable for discharge and is advised to follow up with PCP as outpatient.  Please refer to patient's complete medical chart with documents for a full hospital course, for this is only a brief summary.      ATTENDING ATTESTATION:  Patient seen and examined on 7/12/2021    Patient admitted for acute pancreatitis, unclear etiology suspect automimmune cause given prior hx of high JASS.  Patient is now tolerating soft diet without any abdominal pain, plan for discharge with GI f/u. JASS and IGG4 pending to be followed by GI  Will add protonix for mild reflux sx until seen by GI outpatient    On physical exam patient is NAD, heart RRR, lungs CTAB, ext c/c/e    > 35 minutes spent on discharge and coordination of care

## 2021-07-09 NOTE — DISCHARGE NOTE PROVIDER - NSDCCPCAREPLAN_GEN_ALL_CORE_FT
PRINCIPAL DISCHARGE DIAGNOSIS  Diagnosis: Acute pancreatitis  Assessment and Plan of Treatment: Acute pancreatitis      SECONDARY DISCHARGE DIAGNOSES  Diagnosis: Asymptomatic bacteriuria  Assessment and Plan of Treatment: Asymptomatic bacteriuria    Diagnosis: Hypothyroidism  Assessment and Plan of Treatment: Hypothyroidism    Diagnosis: MGUS (monoclonal gammopathy of unknown significance)  Assessment and Plan of Treatment: MGUS (monoclonal gammopathy of unknown significance)    Diagnosis: CVA (cerebral vascular accident)  Assessment and Plan of Treatment: CVA (cerebral vascular accident)    Diagnosis: HTN (hypertension)  Assessment and Plan of Treatment: HTN (hypertension)     PRINCIPAL DISCHARGE DIAGNOSIS  Diagnosis: Acute pancreatitis  Assessment and Plan of Treatment: You were diagnosed with Pancreatitis which is an inflammation of the pancreas which causeas, nausea, vomiting, upper abdominal pain radiating to the back, abdominal tenderness, fever. Your pancreatitis was idiopathic meaning no clear etiology. You were initially on nothing by mouth, with IV fluids for hydration, pain medication which significantly improved your symptoms.   Please follow up with your PCP in a week from         SECONDARY DISCHARGE DIAGNOSES  Diagnosis: Asymptomatic bacteriuria  Assessment and Plan of Treatment: You were diagnosed with Asymptomatic Bacteriuria which is the presence of bacteria in the urine without any signs or symptoms of urinary tract infection.  You did not require antibiotic treatment at this time. Please follow up with your PCP in a week from discharge.      Diagnosis: Hypothyroidism  Assessment and Plan of Treatment: You have history of Hypothyroidism which means you do not make enough thyroid hormone. On this admission your TSH was 0.7.  Signs & symptoms of low levels thyroid hormone production are- tiredness, getting cold easily, coarse or thin hair, constipation, shortness of breath, swelling.   Please continue to take your home medications and remember it needs to be taken first thing in the morning, on an empty stomach, not to take with any other medications and wait at least 30 minutes to eat.  You need to repeat thyroid function test in 4-6 weeks as outpatient and follow up with your PCP.      Diagnosis: HTN (hypertension)  Assessment and Plan of Treatment: You have a history of Hypertension on LISINOPRIL AND AMLODIPINE AT HOME.  Your blood pressure target is 120-140/80-90, maintain healthy lifestyle, low salt diet, avoid fatty food, weight loss, exercise regularly or stay active as tolerated 30 mins X 3 times per week.  Notify your doctor if you have any of the following symptoms:   (Dizziness, Lightheadedness, Blurry vision, Headache, Chest pain, Shortness of breath.)  Please continue taking your home medications and follow-up with your PCP in 1 week from discharge to adjust medications as needed.      Diagnosis: MGUS (monoclonal gammopathy of unknown significance)  Assessment and Plan of Treatment: You have history of MGUS (monoclonal gammopathy of unknown significance). Please follow up with your Heme Onc as outpatient.       Diagnosis: CVA (cerebral vascular accident)  Assessment and Plan of Treatment: You have history of CVA (cerebral vascular accident). Please follow up with your PCP in a week from discharge.       PRINCIPAL DISCHARGE DIAGNOSIS  Diagnosis: Acute pancreatitis  Assessment and Plan of Treatment: You were diagnosed with Pancreatitis which is an inflammation of the pancreas which causeas, nausea, vomiting, upper abdominal pain radiating to the back, abdominal tenderness, fever. Your pancreatitis was idiopathic meaning no clear etiology. You were initially on nothing by mouth, with IV fluids for hydration, pain medication which significantly improved your symptoms.   Please follow up with your PCP in a week from         SECONDARY DISCHARGE DIAGNOSES  Diagnosis: Asymptomatic bacteriuria  Assessment and Plan of Treatment: You were diagnosed with Asymptomatic Bacteriuria which is the presence of bacteria in the urine without any signs or symptoms of urinary tract infection.  You did not require antibiotic treatment at this time. Please follow up with your PCP in a week from discharge.      Diagnosis: Hypothyroidism  Assessment and Plan of Treatment: You have history of Hypothyroidism which means you do not make enough thyroid hormone. On this admission your TSH was 0.7.  Signs & symptoms of low levels thyroid hormone production are- tiredness, getting cold easily, coarse or thin hair, constipation, shortness of breath, swelling.   Please continue to take your home medications and remember it needs to be taken first thing in the morning, on an empty stomach, not to take with any other medications and wait at least 30 minutes to eat.  You need to repeat thyroid function test in 4-6 weeks as outpatient and follow up with your PCP.      Diagnosis: HTN (hypertension)  Assessment and Plan of Treatment: You have a history of Hypertension on LISINOPRIL AND AMLODIPINE AT HOME.  Your blood pressure target is 120-140/80-90, maintain healthy lifestyle, low salt diet, avoid fatty food, weight loss, exercise regularly or stay active as tolerated 30 mins X 3 times per week.  Notify your doctor if you have any of the following symptoms:   (Dizziness, Lightheadedness, Blurry vision, Headache, Chest pain, Shortness of breath.)  Please continue taking your HOME medications and follow-up with your PCP in 1 week from discharge to adjust medications as needed.      Diagnosis: MGUS (monoclonal gammopathy of unknown significance)  Assessment and Plan of Treatment: You have history of MGUS (monoclonal gammopathy of unknown significance). Please follow up with your Heme Onc as outpatient.       Diagnosis: CVA (cerebral vascular accident)  Assessment and Plan of Treatment: You have history of CVA (cerebral vascular accident). Please follow up with your PCP in a week from discharge.       PRINCIPAL DISCHARGE DIAGNOSIS  Diagnosis: Acute pancreatitis  Assessment and Plan of Treatment: You were diagnosed with Pancreatitis which is an inflammation of the pancreas which causeas, nausea, vomiting, upper abdominal pain radiating to the back, abdominal tenderness, fever. Your pancreatitis was idiopathic meaning no clear etiology. You were initially on nothing by mouth, with IV fluids for hydration, pain medication which significantly improved your symptoms.   Please follow up with your PCP in a week from discharge for further instructions.        SECONDARY DISCHARGE DIAGNOSES  Diagnosis: Asymptomatic bacteriuria  Assessment and Plan of Treatment: You were diagnosed with Asymptomatic Bacteriuria which is the presence of bacteria in the urine without any signs or symptoms of urinary tract infection.  You did not require antibiotic treatment at this time. Please follow up with your PCP in a week from discharge.      Diagnosis: Hypothyroidism  Assessment and Plan of Treatment: You have history of Hypothyroidism which means you do not make enough thyroid hormone. On this admission your TSH was 0.7.  Signs & symptoms of low levels thyroid hormone production are- tiredness, getting cold easily, coarse or thin hair, constipation, shortness of breath, swelling.   Please continue to take your home medications and remember it needs to be taken first thing in the morning, on an empty stomach, not to take with any other medications and wait at least 30 minutes to eat.  You need to repeat thyroid function test in 4-6 weeks as outpatient and follow up with your PCP.      Diagnosis: HTN (hypertension)  Assessment and Plan of Treatment: You have a history of Hypertension on LISINOPRIL AND AMLODIPINE AT HOME.  Your blood pressure target is 120-140/80-90, maintain healthy lifestyle, low salt diet, avoid fatty food, weight loss, exercise regularly or stay active as tolerated 30 mins X 3 times per week.  Notify your doctor if you have any of the following symptoms:   (Dizziness, Lightheadedness, Blurry vision, Headache, Chest pain, Shortness of breath.)  Please continue taking your HOME medications and follow-up with your PCP in 1 week from discharge to adjust medications as needed.      Diagnosis: MGUS (monoclonal gammopathy of unknown significance)  Assessment and Plan of Treatment: You have history of MGUS (monoclonal gammopathy of unknown significance). Please follow up with your Heme Onc as outpatient.       Diagnosis: CVA (cerebral vascular accident)  Assessment and Plan of Treatment: You have history of CVA (cerebral vascular accident). Please follow up with your PCP in a week from discharge.       PRINCIPAL DISCHARGE DIAGNOSIS  Diagnosis: Acute pancreatitis  Assessment and Plan of Treatment: You were diagnosed with Pancreatitis which is an inflammation of the pancreas which causeas, nausea, vomiting, upper abdominal pain radiating to the back, abdominal tenderness, fever. Your pancreatitis was idiopathic meaning no clear etiology. You were initially on nothing by mouth, with IV fluids for hydration, pain medication which significantly improved your symptoms.   Please follow up with your PCP in a week from discharge for further instructions.        SECONDARY DISCHARGE DIAGNOSES  Diagnosis: Asymptomatic bacteriuria  Assessment and Plan of Treatment: You were diagnosed with Asymptomatic Bacteriuria which is the presence of bacteria in the urine without any signs or symptoms of urinary tract infection.  You did not require antibiotic treatment at this time. Please follow up with your PCP in a week from discharge.      Diagnosis: Hypothyroidism  Assessment and Plan of Treatment: You have history of Hypothyroidism which means you do not make enough thyroid hormone. On this admission your TSH was 0.7.  Signs & symptoms of low levels thyroid hormone production are- tiredness, getting cold easily, coarse or thin hair, constipation, shortness of breath, swelling.   Please continue to take your home medications and remember it needs to be taken first thing in the morning, on an empty stomach, not to take with any other medications and wait at least 30 minutes to eat.  You need to repeat thyroid function test in 4-6 weeks as outpatient and follow up with your PCP.      Diagnosis: HTN (hypertension)  Assessment and Plan of Treatment: You have a history of Hypertension on LISINOPRIL AND AMLODIPINE AT HOME.  Your blood pressure target is 120-140/80-90, maintain healthy lifestyle, low salt diet, avoid fatty food, weight loss, exercise regularly or stay active as tolerated 30 mins X 3 times per week.  Notify your doctor if you have any of the following symptoms:   (Dizziness, Lightheadedness, Blurry vision, Headache, Chest pain, Shortness of breath.)  Please continue taking your HOME medications and follow-up with your PCP in 1 week from discharge to adjust medications as needed.  You mentioned that you have an upcoming appointment with your Cardiologist to adjust your home medications.       Diagnosis: MGUS (monoclonal gammopathy of unknown significance)  Assessment and Plan of Treatment: You have history of MGUS (monoclonal gammopathy of unknown significance). Please follow up with your Heme Onc as outpatient.       Diagnosis: CVA (cerebral vascular accident)  Assessment and Plan of Treatment: You have history of CVA (cerebral vascular accident). Please follow up with your PCP in a week from discharge.

## 2021-07-09 NOTE — DISCHARGE NOTE PROVIDER - NSDCMRMEDTOKEN_GEN_ALL_CORE_FT
amLODIPine 5 mg oral tablet: 1 tab(s) orally once a day  aspirin 81 mg oral tablet, chewable: 1 tab(s) orally once a day  atorvastatin 40 mg oral tablet: 1 tab(s) orally once a day  lisinopril 40 mg oral tablet: 1 tab(s) orally once a day  Synthroid 100 mcg (0.1 mg) oral tablet: 1 tab(s) orally once a day   aluminum hydroxide-magnesium hydroxide 200 mg-200 mg/5 mL oral suspension: 30 milliliter(s) orally every 6 hours, As Needed -Dyspepsia   amLODIPine 5 mg oral tablet: 1 tab(s) orally once a day  aspirin 81 mg oral tablet, chewable: 1 tab(s) orally once a day  atorvastatin 40 mg oral tablet: 1 tab(s) orally once a day  lisinopril 40 mg oral tablet: 1 tab(s) orally once a day  omeprazole 40 mg oral delayed release capsule: 1 cap(s) orally once a day   Synthroid 100 mcg (0.1 mg) oral tablet: 1 tab(s) orally once a day

## 2021-07-09 NOTE — DISCHARGE NOTE PROVIDER - CARE PROVIDER_API CALL
Kasi Ferrara  Gastroenterology  [address] [place] [address]  Phone: [phone]  Fax: [phone]  Follow Up Time: 1 week

## 2021-07-09 NOTE — PROGRESS NOTE ADULT - PROBLEM SELECTOR PLAN 4
- h/o HTN, on amlodipine 5mg and lisinopril 40mg at home  - c/w lisinopril 40mg qd  - hold amlodipine given possible etiology of pancreatitis - h/o HTN, on amlodipine 5mg and lisinopril 40mg at home  - c/w home meds w/ parameters. Stop if SBP <110

## 2021-07-10 LAB
ANION GAP SERPL CALC-SCNC: 9 MMOL/L — SIGNIFICANT CHANGE UP (ref 5–17)
BASOPHILS # BLD AUTO: 0.04 K/UL — SIGNIFICANT CHANGE UP (ref 0–0.2)
BASOPHILS NFR BLD AUTO: 0.5 % — SIGNIFICANT CHANGE UP (ref 0–2)
BUN SERPL-MCNC: 22 MG/DL — HIGH (ref 7–18)
CALCIUM SERPL-MCNC: 8.2 MG/DL — LOW (ref 8.4–10.5)
CHLORIDE SERPL-SCNC: 111 MMOL/L — HIGH (ref 96–108)
CHOLEST SERPL-MCNC: 99 MG/DL — SIGNIFICANT CHANGE UP
CO2 SERPL-SCNC: 24 MMOL/L — SIGNIFICANT CHANGE UP (ref 22–31)
COVID-19 SPIKE DOMAIN AB INTERP: NEGATIVE — SIGNIFICANT CHANGE UP
COVID-19 SPIKE DOMAIN ANTIBODY RESULT: 0.4 U/ML — SIGNIFICANT CHANGE UP
CREAT SERPL-MCNC: 0.87 MG/DL — SIGNIFICANT CHANGE UP (ref 0.5–1.3)
EOSINOPHIL # BLD AUTO: 0.16 K/UL — SIGNIFICANT CHANGE UP (ref 0–0.5)
EOSINOPHIL NFR BLD AUTO: 2.1 % — SIGNIFICANT CHANGE UP (ref 0–6)
GLUCOSE SERPL-MCNC: 81 MG/DL — SIGNIFICANT CHANGE UP (ref 70–99)
HCT VFR BLD CALC: 34.3 % — LOW (ref 34.5–45)
HDLC SERPL-MCNC: 45 MG/DL — LOW
HGB BLD-MCNC: 10.8 G/DL — LOW (ref 11.5–15.5)
IMM GRANULOCYTES NFR BLD AUTO: 0.4 % — SIGNIFICANT CHANGE UP (ref 0–1.5)
LIPID PNL WITH DIRECT LDL SERPL: 30 MG/DL — SIGNIFICANT CHANGE UP
LYMPHOCYTES # BLD AUTO: 1.65 K/UL — SIGNIFICANT CHANGE UP (ref 1–3.3)
LYMPHOCYTES # BLD AUTO: 21.6 % — SIGNIFICANT CHANGE UP (ref 13–44)
MAGNESIUM SERPL-MCNC: 2 MG/DL — SIGNIFICANT CHANGE UP (ref 1.6–2.6)
MCHC RBC-ENTMCNC: 30 PG — SIGNIFICANT CHANGE UP (ref 27–34)
MCHC RBC-ENTMCNC: 31.5 GM/DL — LOW (ref 32–36)
MCV RBC AUTO: 95.3 FL — SIGNIFICANT CHANGE UP (ref 80–100)
MONOCYTES # BLD AUTO: 0.79 K/UL — SIGNIFICANT CHANGE UP (ref 0–0.9)
MONOCYTES NFR BLD AUTO: 10.4 % — SIGNIFICANT CHANGE UP (ref 2–14)
NEUTROPHILS # BLD AUTO: 4.96 K/UL — SIGNIFICANT CHANGE UP (ref 1.8–7.4)
NEUTROPHILS NFR BLD AUTO: 65 % — SIGNIFICANT CHANGE UP (ref 43–77)
NON HDL CHOLESTEROL: 54 MG/DL — SIGNIFICANT CHANGE UP
NRBC # BLD: 0 /100 WBCS — SIGNIFICANT CHANGE UP (ref 0–0)
PHOSPHATE SERPL-MCNC: 2.6 MG/DL — SIGNIFICANT CHANGE UP (ref 2.5–4.5)
PLATELET # BLD AUTO: 162 K/UL — SIGNIFICANT CHANGE UP (ref 150–400)
POTASSIUM SERPL-MCNC: 4.3 MMOL/L — SIGNIFICANT CHANGE UP (ref 3.5–5.3)
POTASSIUM SERPL-SCNC: 4.3 MMOL/L — SIGNIFICANT CHANGE UP (ref 3.5–5.3)
RBC # BLD: 3.6 M/UL — LOW (ref 3.8–5.2)
RBC # FLD: 12.6 % — SIGNIFICANT CHANGE UP (ref 10.3–14.5)
SARS-COV-2 IGG+IGM SERPL QL IA: 0.4 U/ML — SIGNIFICANT CHANGE UP
SARS-COV-2 IGG+IGM SERPL QL IA: NEGATIVE — SIGNIFICANT CHANGE UP
SODIUM SERPL-SCNC: 144 MMOL/L — SIGNIFICANT CHANGE UP (ref 135–145)
TRIGL SERPL-MCNC: 121 MG/DL — SIGNIFICANT CHANGE UP
WBC # BLD: 7.63 K/UL — SIGNIFICANT CHANGE UP (ref 3.8–10.5)
WBC # FLD AUTO: 7.63 K/UL — SIGNIFICANT CHANGE UP (ref 3.8–10.5)

## 2021-07-10 PROCEDURE — 99233 SBSQ HOSP IP/OBS HIGH 50: CPT | Mod: GC

## 2021-07-10 RX ORDER — LISINOPRIL 2.5 MG/1
40 TABLET ORAL DAILY
Refills: 0 | Status: DISCONTINUED | OUTPATIENT
Start: 2021-07-10 | End: 2021-07-10

## 2021-07-10 RX ORDER — ACETAMINOPHEN 500 MG
650 TABLET ORAL EVERY 6 HOURS
Refills: 0 | Status: DISCONTINUED | OUTPATIENT
Start: 2021-07-10 | End: 2021-07-12

## 2021-07-10 RX ORDER — LISINOPRIL 2.5 MG/1
40 TABLET ORAL ONCE
Refills: 0 | Status: COMPLETED | OUTPATIENT
Start: 2021-07-10 | End: 2021-07-10

## 2021-07-10 RX ORDER — LISINOPRIL 2.5 MG/1
40 TABLET ORAL DAILY
Refills: 0 | Status: DISCONTINUED | OUTPATIENT
Start: 2021-07-11 | End: 2021-07-12

## 2021-07-10 RX ADMIN — Medication 650 MILLIGRAM(S): at 23:59

## 2021-07-10 RX ADMIN — SODIUM CHLORIDE 200 MILLILITER(S): 9 INJECTION, SOLUTION INTRAVENOUS at 09:46

## 2021-07-10 RX ADMIN — SODIUM CHLORIDE 200 MILLILITER(S): 9 INJECTION, SOLUTION INTRAVENOUS at 06:10

## 2021-07-10 RX ADMIN — PANTOPRAZOLE SODIUM 40 MILLIGRAM(S): 20 TABLET, DELAYED RELEASE ORAL at 11:58

## 2021-07-10 RX ADMIN — Medication 650 MILLIGRAM(S): at 22:27

## 2021-07-10 RX ADMIN — LISINOPRIL 40 MILLIGRAM(S): 2.5 TABLET ORAL at 22:45

## 2021-07-10 RX ADMIN — Medication 30 MILLILITER(S): at 17:50

## 2021-07-10 NOTE — PROGRESS NOTE ADULT - SUBJECTIVE AND OBJECTIVE BOX
Patient is a 81y old  Female who presents with a chief complaint of Acute pancreatitis (2021 13:29)      SUBJECTIVE / OVERNIGHT EVENTS: No acute events overnight, patient's abdominal pain is improving, tolerating clear liquid diet. Still has some epigastric burning after swallowing, ok with trying maalox  ADDITIONAL REVIEW OF SYSTEMS: negative as per above    MEDICATIONS  (STANDING):  lactated ringers. 1000 milliLiter(s) (200 mL/Hr) IV Continuous <Continuous>  pantoprazole  Injectable 40 milliGRAM(s) IV Push daily    MEDICATIONS  (PRN):  HYDROmorphone  Injectable 1 milliGRAM(s) IV Push every 3 hours PRN Severe Pain (7 - 10)  ketorolac   Injectable 30 milliGRAM(s) IV Push every 6 hours PRN Moderate Pain (4 - 6)  ondansetron Injectable 4 milliGRAM(s) IV Push every 8 hours PRN Nausea and/or Vomiting      CAPILLARY BLOOD GLUCOSE        I&O's Summary      PHYSICAL EXAM:  Vital Signs Last 24 Hrs  T(C): 36.7 (10 Jul 2021 13:00), Max: 37.3 (2021 19:25)  T(F): 98.1 (10 Jul 2021 13:00), Max: 99.2 (2021 19:25)  HR: 50 (10 Jul 2021 13:00) (50 - 61)  BP: 151/65 (10 Jul 2021 13:00) (135/66 - 151/65)  BP(mean): --  RR: 18 (10 Jul 2021 13:00) (18 - 18)  SpO2: 95% (10 Jul 2021 13:00) (93% - 95%)    GENERAL: NAD   HEAD:  Atraumatic, Normocephalic  CHEST/LUNG: Clear to auscultation bilaterally; No wheeze  HEART: Regular rate and rhythm; No murmurs, rubs, or gallops  ABDOMEN: Soft, Nontender, Nondistended   EXTREMITIES:  2+ Peripheral Pulses, No clubbing, cyanosis, or edema  PSYCH: AAOx3  NEUROLOGY: non-focal  SKIN: No rashes or lesions    LABS:                        10.8   7.63  )-----------( 162      ( 10 Jul 2021 06:53 )             34.3     07-10    144  |  111<H>  |  22<H>  ----------------------------<  81  4.3   |  24  |  0.87    Ca    8.2<L>      10 Jul 2021 06:53  Phos  2.6     07-10  Mg     2.0     07-10    TPro  7.5  /  Alb  3.9  /  TBili  0.5  /  DBili  x   /  AST  26  /  ALT  20  /  AlkPhos  99  07-09      CARDIAC MARKERS ( 2021 17:24 )  <0.015 ng/mL / x     / x     / x     / x          Urinalysis Basic - ( 2021 20:56 )    Color: Yellow / Appearance: Clear / S.020 / pH: x  Gluc: x / Ketone: Small  / Bili: Negative / Urobili: Negative   Blood: x / Protein: 100 / Nitrite: Negative   Leuk Esterase: Moderate / RBC: 5-10 /HPF / WBC 26-50 /HPF   Sq Epi: x / Non Sq Epi: Few /HPF / Bacteria: Moderate /HPF        Culture - Blood (collected 2021 10:39)  Source: .Blood Blood-Venous  Preliminary Report (10 Jul 2021 11:02):    No growth to date.    Culture - Blood (collected 2021 10:39)  Source: .Blood Blood-Venous  Preliminary Report (10 Jul 2021 11:02):    No growth to date.      Trend Procalcitonin          D-Dimer:      RADIOLOGY & ADDITIONAL TESTS:  Results Reviewed:   Imaging Personally Reviewed:  Electrocardiogram Personally Reviewed:    COORDINATION OF CARE:  Care Discussed with Consultants/Other Providers [Y/N]:  Prior or Outpatient Records Reviewed [Y/N]:

## 2021-07-10 NOTE — PROGRESS NOTE ADULT - PROBLEM SELECTOR PLAN 1
No prior episodes before. Lipase 3747. US abdomen with no gallstones, no prior hx of alcohol abuse. TG normal, calcium normal. Suspect automminue cause of pancreatitis given prior hx of + JASS.   - tolerating clear liquid diet, will advance diet as tolerated  - Protonix 40 IV qday  - JASS and IgG4 pending  - will need outpatient GI f/u

## 2021-07-10 NOTE — PROGRESS NOTE ADULT - PROBLEM SELECTOR PLAN 4
- h/o HTN, on amlodipine 5mg and lisinopril 40mg at home  - c/w home meds w/ parameters. Stop if SBP <110

## 2021-07-11 LAB
ANION GAP SERPL CALC-SCNC: 10 MMOL/L — SIGNIFICANT CHANGE UP (ref 5–17)
BASOPHILS # BLD AUTO: 0.02 K/UL — SIGNIFICANT CHANGE UP (ref 0–0.2)
BASOPHILS NFR BLD AUTO: 0.3 % — SIGNIFICANT CHANGE UP (ref 0–2)
BUN SERPL-MCNC: 18 MG/DL — SIGNIFICANT CHANGE UP (ref 7–18)
CALCIUM SERPL-MCNC: 8.3 MG/DL — LOW (ref 8.4–10.5)
CHLORIDE SERPL-SCNC: 111 MMOL/L — HIGH (ref 96–108)
CHOLEST SERPL-MCNC: 102 MG/DL — SIGNIFICANT CHANGE UP
CO2 SERPL-SCNC: 23 MMOL/L — SIGNIFICANT CHANGE UP (ref 22–31)
CREAT SERPL-MCNC: 0.82 MG/DL — SIGNIFICANT CHANGE UP (ref 0.5–1.3)
CULTURE RESULTS: SIGNIFICANT CHANGE UP
EOSINOPHIL # BLD AUTO: 0.2 K/UL — SIGNIFICANT CHANGE UP (ref 0–0.5)
EOSINOPHIL NFR BLD AUTO: 3.4 % — SIGNIFICANT CHANGE UP (ref 0–6)
GLUCOSE SERPL-MCNC: 84 MG/DL — SIGNIFICANT CHANGE UP (ref 70–99)
HCT VFR BLD CALC: 32.5 % — LOW (ref 34.5–45)
HDLC SERPL-MCNC: 46 MG/DL — LOW
HGB BLD-MCNC: 10.6 G/DL — LOW (ref 11.5–15.5)
IMM GRANULOCYTES NFR BLD AUTO: 0.5 % — SIGNIFICANT CHANGE UP (ref 0–1.5)
LIPID PNL WITH DIRECT LDL SERPL: 29 MG/DL — SIGNIFICANT CHANGE UP
LYMPHOCYTES # BLD AUTO: 1.33 K/UL — SIGNIFICANT CHANGE UP (ref 1–3.3)
LYMPHOCYTES # BLD AUTO: 22.9 % — SIGNIFICANT CHANGE UP (ref 13–44)
MAGNESIUM SERPL-MCNC: 2.1 MG/DL — SIGNIFICANT CHANGE UP (ref 1.6–2.6)
MCHC RBC-ENTMCNC: 29.8 PG — SIGNIFICANT CHANGE UP (ref 27–34)
MCHC RBC-ENTMCNC: 32.6 GM/DL — SIGNIFICANT CHANGE UP (ref 32–36)
MCV RBC AUTO: 91.3 FL — SIGNIFICANT CHANGE UP (ref 80–100)
MONOCYTES # BLD AUTO: 0.8 K/UL — SIGNIFICANT CHANGE UP (ref 0–0.9)
MONOCYTES NFR BLD AUTO: 13.8 % — SIGNIFICANT CHANGE UP (ref 2–14)
NEUTROPHILS # BLD AUTO: 3.43 K/UL — SIGNIFICANT CHANGE UP (ref 1.8–7.4)
NEUTROPHILS NFR BLD AUTO: 59.1 % — SIGNIFICANT CHANGE UP (ref 43–77)
NON HDL CHOLESTEROL: 56 MG/DL — SIGNIFICANT CHANGE UP
NRBC # BLD: 0 /100 WBCS — SIGNIFICANT CHANGE UP (ref 0–0)
PHOSPHATE SERPL-MCNC: 2.4 MG/DL — LOW (ref 2.5–4.5)
PLATELET # BLD AUTO: 153 K/UL — SIGNIFICANT CHANGE UP (ref 150–400)
POTASSIUM SERPL-MCNC: 3.6 MMOL/L — SIGNIFICANT CHANGE UP (ref 3.5–5.3)
POTASSIUM SERPL-SCNC: 3.6 MMOL/L — SIGNIFICANT CHANGE UP (ref 3.5–5.3)
RBC # BLD: 3.56 M/UL — LOW (ref 3.8–5.2)
RBC # FLD: 12 % — SIGNIFICANT CHANGE UP (ref 10.3–14.5)
SODIUM SERPL-SCNC: 144 MMOL/L — SIGNIFICANT CHANGE UP (ref 135–145)
SPECIMEN SOURCE: SIGNIFICANT CHANGE UP
TRIGL SERPL-MCNC: 136 MG/DL — SIGNIFICANT CHANGE UP
WBC # BLD: 5.81 K/UL — SIGNIFICANT CHANGE UP (ref 3.8–10.5)
WBC # FLD AUTO: 5.81 K/UL — SIGNIFICANT CHANGE UP (ref 3.8–10.5)

## 2021-07-11 PROCEDURE — 99233 SBSQ HOSP IP/OBS HIGH 50: CPT | Mod: GC

## 2021-07-11 RX ORDER — AMLODIPINE BESYLATE 2.5 MG/1
5 TABLET ORAL DAILY
Refills: 0 | Status: DISCONTINUED | OUTPATIENT
Start: 2021-07-11 | End: 2021-07-12

## 2021-07-11 RX ORDER — SENNA PLUS 8.6 MG/1
2 TABLET ORAL AT BEDTIME
Refills: 0 | Status: DISCONTINUED | OUTPATIENT
Start: 2021-07-11 | End: 2021-07-12

## 2021-07-11 RX ORDER — PANTOPRAZOLE SODIUM 20 MG/1
40 TABLET, DELAYED RELEASE ORAL
Refills: 0 | Status: DISCONTINUED | OUTPATIENT
Start: 2021-07-11 | End: 2021-07-12

## 2021-07-11 RX ORDER — LEVOTHYROXINE SODIUM 125 MCG
100 TABLET ORAL DAILY
Refills: 0 | Status: DISCONTINUED | OUTPATIENT
Start: 2021-07-11 | End: 2021-07-12

## 2021-07-11 RX ORDER — ENOXAPARIN SODIUM 100 MG/ML
40 INJECTION SUBCUTANEOUS DAILY
Refills: 0 | Status: DISCONTINUED | OUTPATIENT
Start: 2021-07-11 | End: 2021-07-12

## 2021-07-11 RX ADMIN — ENOXAPARIN SODIUM 40 MILLIGRAM(S): 100 INJECTION SUBCUTANEOUS at 11:38

## 2021-07-11 RX ADMIN — SENNA PLUS 2 TABLET(S): 8.6 TABLET ORAL at 22:07

## 2021-07-11 RX ADMIN — Medication 30 MILLIGRAM(S): at 01:28

## 2021-07-11 RX ADMIN — Medication 30 MILLIGRAM(S): at 00:57

## 2021-07-11 RX ADMIN — LISINOPRIL 40 MILLIGRAM(S): 2.5 TABLET ORAL at 06:15

## 2021-07-11 RX ADMIN — AMLODIPINE BESYLATE 5 MILLIGRAM(S): 2.5 TABLET ORAL at 22:07

## 2021-07-11 NOTE — PROGRESS NOTE ADULT - PROBLEM SELECTOR PROBLEM 6
MGUS (monoclonal gammopathy of unknown significance)

## 2021-07-11 NOTE — PROGRESS NOTE ADULT - SUBJECTIVE AND OBJECTIVE BOX
PGY-1 Progress Note discussed with attending    PAGER #: [[phone]] TILL 5:00 PM  PLEASE CONTACT ON CALL TEAM:  - On Call Team (Please refer to Felecia) FROM 5:00 PM - 8:30PM  - Nightfloat Team FROM 8:30 -7:30 AM    CHIEF COMPLAINT & BRIEF HOSPITAL COURSE:    This is a 81 year old, ambulates independently, AAOx3, from home, w/ PMH of CVA, HTN, hypothyroidism, and MGUS, p/w abdominal pain x2d. Initially she felt increased abdominal pressure from gas, but it developed to sharp, intermittent, 3/10, non-radiating, mid-epigastric abdominal pain, which worsened with oral intake. Patient endorses nausea and NBNB vomiting x4-5x. The pt was admitted for acute pancreatitis. Lipase was found to be elevated. Pt denies any alcohol use, blood alcohol level was low. RUQ U/S showed no evidence of cholelithiasis or evidence of acute cholecystitis. TG level was low. Pt complained of a little nausea, when nausea decreases diet will be advanced as tolerated. All home meds will be reinstated, as none of her medications are associated with pancreatitis. UA was positive for infection, however pt complains of no urinary symptoms.    INTERVAL HPI/OVERNIGHT EVENTS:   Pt was admitted overnight.    REVIEW OF SYSTEMS:  CONSTITUTIONAL: No fever, weight loss, or fatigue  RESPIRATORY: No cough, wheezing, chills or hemoptysis; No shortness of breath  CARDIOVASCULAR: No chest pain, palpitations, dizziness, or leg swelling  GASTROINTESTINAL: Mild abdominal pain w/ nausea, however no vomiting, or hematemesis. No diarrhea or constipation. No melena or hematochezia.  GENITOURINARY: No dysuria or hematuria, urinary frequency  NEUROLOGICAL: No headaches, memory loss, loss of strength, numbness, or tremors  SKIN: No itching, burning, rashes, or lesions     Vital Signs Last 24 Hrs  T(C): 36.5 (09 Jul 2021 04:51), Max: 37.2 (09 Jul 2021 00:03)  T(F): 97.7 (09 Jul 2021 04:51), Max: 98.9 (09 Jul 2021 00:03)  HR: 77 (09 Jul 2021 04:51) (59 - 77)  BP: 157/85 (09 Jul 2021 04:51) (157/78 - 183/78)  BP(mean): --  RR: 18 (09 Jul 2021 04:51) (18 - 18)  SpO2: 95% (09 Jul 2021 04:51) (95% - 98%)    PHYSICAL EXAMINATION:  GENERAL: NAD, well built  HEAD:  Atraumatic, Normocephalic  EYES:  conjunctiva and sclera clear  NECK: Supple, No JVD, Normal thyroid  CHEST/LUNG: Clear to auscultation. Clear to percussion bilaterally; No rales, rhonchi, wheezing, or rubs  HEART: Regular rate and rhythm; No murmurs, rubs, or gallops  ABDOMEN: Hernia repair scar, mild pain on deep palpation in the mid epigastric area. Soft, Nondistended; Bowel sounds present  NERVOUS SYSTEM:  Alert & Oriented X3,    EXTREMITIES:  2+ Peripheral Pulses, No clubbing, cyanosis, or edema  SKIN: warm dry                          12.4   7.99  )-----------( 203      ( 09 Jul 2021 06:26 )             38.8     07-09    144  |  107  |  26<H>  ----------------------------<  146<H>  3.8   |  32<H>  |  1.21    Ca    8.6      09 Jul 2021 06:26  Phos  5.9     07-09  Mg     2.0     07-09    TPro  7.5  /  Alb  3.9  /  TBili  0.5  /  DBili  x   /  AST  26  /  ALT  20  /  AlkPhos  99  07-09    LIVER FUNCTIONS - ( 09 Jul 2021 06:26 )  Alb: 3.9 g/dL / Pro: 7.5 g/dL / ALK PHOS: 99 U/L / ALT: 20 U/L DA / AST: 26 U/L / GGT: x           CARDIAC MARKERS ( 08 Jul 2021 17:24 )  <0.015 ng/mL / x     / x     / x     / x              CAPILLARY BLOOD GLUCOSE      RADIOLOGY & ADDITIONAL TESTS:

## 2021-07-11 NOTE — PROGRESS NOTE ADULT - ASSESSMENT
Patient is a 81 year old, ambulates independently, AAOx3, from home, w/ PMH of CVA, HTN, hypothyroidism, and MGUS, p/w abdominal pain x2d. Admitted for acute pancreatitis  Pt was given a 24hr discharge notice
Patient is a 81 year old, ambulates independently, AAOx3, from home, w/ PMH of CVA, HTN, hypothyroidism, and MGUS, p/w abdominal pain x2d. Admitted for acute pancreatitis
Patient is a 81 year old, ambulates independently, AAOx3, from home, w/ PMH of CVA, HTN, hypothyroidism, and MGUS, p/w abdominal pain x2d. Admitted for acute pancreatitis

## 2021-07-11 NOTE — PROGRESS NOTE ADULT - PROBLEM SELECTOR PLAN 1
- p/w epigastric pain  - Idiopathic   - VS wnl  - WBC 10.7 -> 7.99  - LFTs normal  - tG 74 (normal)  - Elevated lipase 3747  - US abd no signs of acute cholecystitis  - s/p 1L LR  - c/w IVF, pain management  -Zofran 4mg IV Push PRN  - Protonix 40 PO PPx  - Advance diet as tolerated if pt doesn't have nausea

## 2021-07-11 NOTE — PROGRESS NOTE ADULT - PROBLEM SELECTOR PLAN 2
- noted to have positive U/A  - asxs  - hold abx  - f/u Ucx, Bcx

## 2021-07-11 NOTE — PROGRESS NOTE ADULT - PROBLEM SELECTOR PLAN 3
- h/o CVA, on ASA and statin  - c/w home meds

## 2021-07-11 NOTE — PROGRESS NOTE ADULT - PROBLEM SELECTOR PLAN 6
- h/o MGUS; follows hem/onc as outpatient

## 2021-07-11 NOTE — PROGRESS NOTE ADULT - ATTENDING COMMENTS
81 year old, ambulates independently, AAOx3, from home, w/ PMH of CVA, HTN, hypothyroidism, and MGUS, p/w abdominal pain and n/v for 2days. In the ED, lipase >3K. Was kept NPO with IVF, antiemetics for nausea/vomiting.     Today, patient's tolerating mechanical soft/regular diet. Had some epigastric burning with abdominal pain last night but was able to tolerate breakfast and lunch with no abdominal pain. Patient advised to slowly advance diet at home.       Per patient first incidence of acute pancreatitis, etiology unclear query autoimmune pancreatitis given prior history of JASS + vs idiopathic. US RUQ with no gallstones, calcium normal, TG normal, no alcohol hx, unlikely drug induced. Will order repeat JASS, IgG4, patient to f/u outpatient with GI
11 year old, ambulates independently, AAOx3, from home, w/ PMH of CVA, HTN, hypothyroidism, and MGUS, p/w abdominal pain and n/v for 2days. In the ED, lipase >3K. Was kept NPO with IVF, antiemetics for nausea/vomiting. Today, patient's nausea and vomiting improved, started on clear liquid diet. Abdominal pain controlled. Per patient first incidence of acute pancreatitis, etiology unclear query autoimmune pancreatitis given prior history of JASS + vs idiopathic. US RUQ with no gallstones, calcium normal, TG normal, no alcohol hx, unlikely drug induced. Will order repeat JASS, IgG4, patient to f/u outpatient with GI

## 2021-07-12 ENCOUNTER — TRANSCRIPTION ENCOUNTER (OUTPATIENT)
Age: 81
End: 2021-07-12

## 2021-07-12 VITALS
HEART RATE: 53 BPM | SYSTOLIC BLOOD PRESSURE: 135 MMHG | TEMPERATURE: 98 F | OXYGEN SATURATION: 99 % | RESPIRATION RATE: 17 BRPM | DIASTOLIC BLOOD PRESSURE: 71 MMHG

## 2021-07-12 LAB
ANA PAT FLD IF-IMP: ABNORMAL
ANA TITR SER: ABNORMAL
ANION GAP SERPL CALC-SCNC: 8 MMOL/L — SIGNIFICANT CHANGE UP (ref 5–17)
BASOPHILS # BLD AUTO: 0.03 K/UL — SIGNIFICANT CHANGE UP (ref 0–0.2)
BASOPHILS NFR BLD AUTO: 0.4 % — SIGNIFICANT CHANGE UP (ref 0–2)
BUN SERPL-MCNC: 18 MG/DL — SIGNIFICANT CHANGE UP (ref 7–18)
CALCIUM SERPL-MCNC: 8.5 MG/DL — SIGNIFICANT CHANGE UP (ref 8.4–10.5)
CHLORIDE SERPL-SCNC: 106 MMOL/L — SIGNIFICANT CHANGE UP (ref 96–108)
CHOLEST SERPL-MCNC: 120 MG/DL — SIGNIFICANT CHANGE UP
CO2 SERPL-SCNC: 26 MMOL/L — SIGNIFICANT CHANGE UP (ref 22–31)
CREAT SERPL-MCNC: 0.86 MG/DL — SIGNIFICANT CHANGE UP (ref 0.5–1.3)
EOSINOPHIL # BLD AUTO: 0.15 K/UL — SIGNIFICANT CHANGE UP (ref 0–0.5)
EOSINOPHIL NFR BLD AUTO: 2.2 % — SIGNIFICANT CHANGE UP (ref 0–6)
GLUCOSE BLDC GLUCOMTR-MCNC: 113 MG/DL — HIGH (ref 70–99)
GLUCOSE SERPL-MCNC: 103 MG/DL — HIGH (ref 70–99)
HCT VFR BLD CALC: 35.4 % — SIGNIFICANT CHANGE UP (ref 34.5–45)
HDLC SERPL-MCNC: 55 MG/DL — SIGNIFICANT CHANGE UP
HGB BLD-MCNC: 11.8 G/DL — SIGNIFICANT CHANGE UP (ref 11.5–15.5)
IGG SERPL-MCNC: 643 MG/DL — SIGNIFICANT CHANGE UP (ref 586–1602)
IGG1 SER-MCNC: 252 MG/DL — SIGNIFICANT CHANGE UP (ref 248–810)
IGG2 SER-MCNC: 285 MG/DL — SIGNIFICANT CHANGE UP (ref 130–555)
IGG3 SER-MCNC: 29 MG/DL — SIGNIFICANT CHANGE UP (ref 15–102)
IGG4 SER-MCNC: 21 MG/DL — SIGNIFICANT CHANGE UP (ref 2–96)
IMM GRANULOCYTES NFR BLD AUTO: 0.6 % — SIGNIFICANT CHANGE UP (ref 0–1.5)
LIPID PNL WITH DIRECT LDL SERPL: 35 MG/DL — SIGNIFICANT CHANGE UP
LYMPHOCYTES # BLD AUTO: 1.19 K/UL — SIGNIFICANT CHANGE UP (ref 1–3.3)
LYMPHOCYTES # BLD AUTO: 17.3 % — SIGNIFICANT CHANGE UP (ref 13–44)
MAGNESIUM SERPL-MCNC: 2 MG/DL — SIGNIFICANT CHANGE UP (ref 1.6–2.6)
MCHC RBC-ENTMCNC: 30.1 PG — SIGNIFICANT CHANGE UP (ref 27–34)
MCHC RBC-ENTMCNC: 33.3 GM/DL — SIGNIFICANT CHANGE UP (ref 32–36)
MCV RBC AUTO: 90.3 FL — SIGNIFICANT CHANGE UP (ref 80–100)
MONOCYTES # BLD AUTO: 0.68 K/UL — SIGNIFICANT CHANGE UP (ref 0–0.9)
MONOCYTES NFR BLD AUTO: 9.9 % — SIGNIFICANT CHANGE UP (ref 2–14)
NEUTROPHILS # BLD AUTO: 4.77 K/UL — SIGNIFICANT CHANGE UP (ref 1.8–7.4)
NEUTROPHILS NFR BLD AUTO: 69.6 % — SIGNIFICANT CHANGE UP (ref 43–77)
NON HDL CHOLESTEROL: 65 MG/DL — SIGNIFICANT CHANGE UP
NRBC # BLD: 0 /100 WBCS — SIGNIFICANT CHANGE UP (ref 0–0)
PHOSPHATE SERPL-MCNC: 2.8 MG/DL — SIGNIFICANT CHANGE UP (ref 2.5–4.5)
PLATELET # BLD AUTO: 176 K/UL — SIGNIFICANT CHANGE UP (ref 150–400)
POTASSIUM SERPL-MCNC: 3.5 MMOL/L — SIGNIFICANT CHANGE UP (ref 3.5–5.3)
POTASSIUM SERPL-SCNC: 3.5 MMOL/L — SIGNIFICANT CHANGE UP (ref 3.5–5.3)
RBC # BLD: 3.92 M/UL — SIGNIFICANT CHANGE UP (ref 3.8–5.2)
RBC # FLD: 12 % — SIGNIFICANT CHANGE UP (ref 10.3–14.5)
SODIUM SERPL-SCNC: 140 MMOL/L — SIGNIFICANT CHANGE UP (ref 135–145)
TRIGL SERPL-MCNC: 150 MG/DL — HIGH
WBC # BLD: 6.86 K/UL — SIGNIFICANT CHANGE UP (ref 3.8–10.5)
WBC # FLD AUTO: 6.86 K/UL — SIGNIFICANT CHANGE UP (ref 3.8–10.5)

## 2021-07-12 PROCEDURE — 80053 COMPREHEN METABOLIC PANEL: CPT

## 2021-07-12 PROCEDURE — 80048 BASIC METABOLIC PNL TOTAL CA: CPT

## 2021-07-12 PROCEDURE — 83605 ASSAY OF LACTIC ACID: CPT

## 2021-07-12 PROCEDURE — 36415 COLL VENOUS BLD VENIPUNCTURE: CPT

## 2021-07-12 PROCEDURE — 80307 DRUG TEST PRSMV CHEM ANLYZR: CPT

## 2021-07-12 PROCEDURE — 86769 SARS-COV-2 COVID-19 ANTIBODY: CPT

## 2021-07-12 PROCEDURE — 87086 URINE CULTURE/COLONY COUNT: CPT

## 2021-07-12 PROCEDURE — 83690 ASSAY OF LIPASE: CPT

## 2021-07-12 PROCEDURE — 96376 TX/PRO/DX INJ SAME DRUG ADON: CPT

## 2021-07-12 PROCEDURE — 83036 HEMOGLOBIN GLYCOSYLATED A1C: CPT

## 2021-07-12 PROCEDURE — 82962 GLUCOSE BLOOD TEST: CPT

## 2021-07-12 PROCEDURE — 87635 SARS-COV-2 COVID-19 AMP PRB: CPT

## 2021-07-12 PROCEDURE — 83735 ASSAY OF MAGNESIUM: CPT

## 2021-07-12 PROCEDURE — 76705 ECHO EXAM OF ABDOMEN: CPT

## 2021-07-12 PROCEDURE — 84484 ASSAY OF TROPONIN QUANT: CPT

## 2021-07-12 PROCEDURE — 96375 TX/PRO/DX INJ NEW DRUG ADDON: CPT

## 2021-07-12 PROCEDURE — 81001 URINALYSIS AUTO W/SCOPE: CPT

## 2021-07-12 PROCEDURE — 84478 ASSAY OF TRIGLYCERIDES: CPT

## 2021-07-12 PROCEDURE — 84100 ASSAY OF PHOSPHORUS: CPT

## 2021-07-12 PROCEDURE — 99285 EMERGENCY DEPT VISIT HI MDM: CPT | Mod: 25

## 2021-07-12 PROCEDURE — 93005 ELECTROCARDIOGRAM TRACING: CPT

## 2021-07-12 PROCEDURE — 86038 ANTINUCLEAR ANTIBODIES: CPT

## 2021-07-12 PROCEDURE — 82787 IGG 1 2 3 OR 4 EACH: CPT

## 2021-07-12 PROCEDURE — 96374 THER/PROPH/DIAG INJ IV PUSH: CPT

## 2021-07-12 PROCEDURE — 87040 BLOOD CULTURE FOR BACTERIA: CPT

## 2021-07-12 PROCEDURE — 84443 ASSAY THYROID STIM HORMONE: CPT

## 2021-07-12 PROCEDURE — 85025 COMPLETE CBC W/AUTO DIFF WBC: CPT

## 2021-07-12 PROCEDURE — 80061 LIPID PANEL: CPT

## 2021-07-12 PROCEDURE — 99239 HOSP IP/OBS DSCHRG MGMT >30: CPT

## 2021-07-12 RX ORDER — AMLODIPINE BESYLATE 2.5 MG/1
5 TABLET ORAL DAILY
Refills: 0 | Status: DISCONTINUED | OUTPATIENT
Start: 2021-07-12 | End: 2021-07-12

## 2021-07-12 RX ORDER — AMLODIPINE BESYLATE 2.5 MG/1
10 TABLET ORAL DAILY
Refills: 0 | Status: DISCONTINUED | OUTPATIENT
Start: 2021-07-12 | End: 2021-07-12

## 2021-07-12 RX ORDER — AMLODIPINE BESYLATE 2.5 MG/1
5 TABLET ORAL ONCE
Refills: 0 | Status: DISCONTINUED | OUTPATIENT
Start: 2021-07-12 | End: 2021-07-12

## 2021-07-12 RX ORDER — OMEPRAZOLE 10 MG/1
1 CAPSULE, DELAYED RELEASE ORAL
Qty: 30 | Refills: 0
Start: 2021-07-12 | End: 2021-08-10

## 2021-07-12 RX ADMIN — AMLODIPINE BESYLATE 5 MILLIGRAM(S): 2.5 TABLET ORAL at 08:53

## 2021-07-12 RX ADMIN — LISINOPRIL 40 MILLIGRAM(S): 2.5 TABLET ORAL at 05:54

## 2021-07-12 RX ADMIN — PANTOPRAZOLE SODIUM 40 MILLIGRAM(S): 20 TABLET, DELAYED RELEASE ORAL at 05:54

## 2021-07-12 RX ADMIN — Medication 100 MICROGRAM(S): at 05:54

## 2021-07-12 RX ADMIN — AMLODIPINE BESYLATE 10 MILLIGRAM(S): 2.5 TABLET ORAL at 08:40

## 2021-07-12 NOTE — DISCHARGE NOTE NURSING/CASE MANAGEMENT/SOCIAL WORK - PATIENT PORTAL LINK FT
You can access the FollowMyHealth Patient Portal offered by [facility] by registering at the following website: [email or web address]. By joining [facility]’s FollowMyHealth portal, you will also be able to view your health information using other applications (apps) compatible with our system.

## 2021-07-13 LAB — GLUCOSE BLDC GLUCOMTR-MCNC: 114 MG/DL — HIGH (ref 70–99)

## 2021-07-14 LAB
CULTURE RESULTS: SIGNIFICANT CHANGE UP
CULTURE RESULTS: SIGNIFICANT CHANGE UP
SPECIMEN SOURCE: SIGNIFICANT CHANGE UP
SPECIMEN SOURCE: SIGNIFICANT CHANGE UP

## 2021-07-15 PROBLEM — I63.9 CEREBRAL INFARCTION, UNSPECIFIED: Chronic | Status: ACTIVE | Noted: 2021-07-09

## 2021-07-15 PROBLEM — E03.9 HYPOTHYROIDISM, UNSPECIFIED: Chronic | Status: ACTIVE | Noted: 2021-07-09

## 2021-07-15 PROBLEM — I10 ESSENTIAL (PRIMARY) HYPERTENSION: Chronic | Status: ACTIVE | Noted: 2021-07-09

## 2021-08-02 ENCOUNTER — APPOINTMENT (OUTPATIENT)
Dept: GASTROENTEROLOGY | Facility: CLINIC | Age: 81
End: 2021-08-02
Payer: MEDICARE

## 2021-08-02 VITALS
DIASTOLIC BLOOD PRESSURE: 80 MMHG | WEIGHT: 139 LBS | HEIGHT: 65 IN | BODY MASS INDEX: 23.16 KG/M2 | TEMPERATURE: 98 F | SYSTOLIC BLOOD PRESSURE: 156 MMHG

## 2021-08-02 DIAGNOSIS — I63.9 CEREBRAL INFARCTION, UNSPECIFIED: ICD-10-CM

## 2021-08-02 PROCEDURE — 99204 OFFICE O/P NEW MOD 45 MIN: CPT

## 2021-08-02 NOTE — HISTORY OF PRESENT ILLNESS
[Heartburn] : denies heartburn [Nausea] : denies nausea [Vomiting] : denies vomiting [Diarrhea] : denies diarrhea [Constipation] : denies constipation [Yellow Skin Or Eyes (Jaundice)] : denies jaundice [Abdominal Swelling] : denies abdominal swelling [Rectal Pain] : denies rectal pain [Abdominal Pain] : abdominal pain [Pancreatitis] : pancreatitis [Abdominal Surgery] : abdominal surgery [GERD] : no gastroesophageal reflux disease [Hiatus Hernia] : no hiatus hernia [Peptic Ulcer Disease] : no peptic ulcer disease [Cholelithiasis] : no cholelithiasis [Kidney Stone] : no kidney stone [Inflammatory Bowel Disease] : no inflammatory bowel disease [Irritable Bowel Syndrome] : no irritable bowel syndrome [Diverticulitis] : no diverticulitis [Malignancy] : no malignancy [Appendectomy] : no appendectomy [de-identified] : 82 yo female , hx of KETO Diet for more than 1 year, with presentation to [facility] recently with recent pancreaitits. Reportedly had normal lipids (reviewed labs from [facility] from June); No hx of diabetes. No weight loss. Recent dx of MGUS followed up at [facility].\par \par Sonogram was normal and without gallstones. MRCP not obtained. Lipids were normal, TG normal, Lipase on presentation 3747. IgG4 normal.\par \par She was rx ompeprazole at the hospital but has no GERD. \par \par The complete hospital records at [facility] hospital was reviewed.

## 2021-08-02 NOTE — ASSESSMENT
[FreeTextEntry1] : 81-year-old female previously on keto diet with acute pancreatitis with a normal sonogram, lipids and triglycerides. The IgG4 subfraction was normal essentially excluding autoimmune pancreatitis. As discussed with the patient, the differential diagnosis includes pancreatic cyst, IPMN or other neoplasms. She will obtain an MRCP at a radiology facility closer to her home in [place]. She has identified [facility] radiology as the facility that she would like to visit. I provided her with a prescription for this and advised her to contact me prior to going for the MRCP as she request benzodiazepine prescription as an anxiolytic. She will then be seen in follow-up soon after the MRCP or certainly within 6 weeks from today.

## 2021-08-02 NOTE — PHYSICAL EXAM
[General Appearance - Alert] : alert [General Appearance - In No Acute Distress] : in no acute distress [Sclera] : the sclera and conjunctiva were normal [PERRL With Normal Accommodation] : pupils were equal in size, round, and reactive to light [Extraocular Movements] : extraocular movements were intact [Outer Ear] : the ears and nose were normal in appearance [Oropharynx] : the oropharynx was normal [Neck Appearance] : the appearance of the neck was normal [Neck Cervical Mass (___cm)] : no neck mass was observed [Jugular Venous Distention Increased] : there was no jugular-venous distention [Thyroid Diffuse Enlargement] : the thyroid was not enlarged [Thyroid Nodule] : there were no palpable thyroid nodules [Auscultation Breath Sounds / Voice Sounds] : lungs were clear to auscultation bilaterally [Heart Rate And Rhythm] : heart rate was normal and rhythm regular [Heart Sounds] : normal S1 and S2 [Heart Sounds Gallop] : no gallops [Murmurs] : no murmurs [Heart Sounds Pericardial Friction Rub] : no pericardial rub [Full Pulse] : the pedal pulses are present [Edema] : there was no peripheral edema [Bowel Sounds] : normal bowel sounds [Abdomen Soft] : soft [Abdomen Tenderness] : non-tender [Abdomen Mass (___ Cm)] : no abdominal mass palpated [Cervical Lymph Nodes Enlarged Posterior Bilaterally] : posterior cervical [Cervical Lymph Nodes Enlarged Anterior Bilaterally] : anterior cervical [Supraclavicular Lymph Nodes Enlarged Bilaterally] : supraclavicular [Axillary Lymph Nodes Enlarged Bilaterally] : axillary [Femoral Lymph Nodes Enlarged Bilaterally] : femoral [Inguinal Lymph Nodes Enlarged Bilaterally] : inguinal [Abnormal Walk] : normal gait [Nail Clubbing] : no clubbing  or cyanosis of the fingernails [Musculoskeletal - Swelling] : no joint swelling seen [Motor Tone] : muscle strength and tone were normal [Skin Color & Pigmentation] : normal skin color and pigmentation [Skin Turgor] : normal skin turgor [] : no rash [Oriented To Time, Place, And Person] : oriented to person, place, and time [Impaired Insight] : insight and judgment were intact [Affect] : the affect was normal

## 2021-08-11 ENCOUNTER — APPOINTMENT (OUTPATIENT)
Dept: SURGERY | Facility: CLINIC | Age: 81
End: 2021-08-11
Payer: MEDICARE

## 2021-08-11 VITALS
HEIGHT: 65 IN | WEIGHT: 139 LBS | DIASTOLIC BLOOD PRESSURE: 96 MMHG | SYSTOLIC BLOOD PRESSURE: 161 MMHG | BODY MASS INDEX: 23.16 KG/M2 | OXYGEN SATURATION: 99 % | HEART RATE: 60 BPM

## 2021-08-11 VITALS — TEMPERATURE: 97.2 F

## 2021-08-11 PROCEDURE — 99213 OFFICE O/P EST LOW 20 MIN: CPT

## 2021-08-11 NOTE — PHYSICAL EXAM
[Respiratory Effort] : normal respiratory effort [Normal Rate and Rhythm] : normal rate and rhythm [Alert] : alert [Oriented to Person] : oriented to person [Oriented to Place] : oriented to place [Oriented to Time] : oriented to time [Abdominal Masses] : No abdominal masses [Abdomen Tenderness] : ~T ~M No abdominal tenderness [Skin Ulcer] : no ulcer [Skin Induration] : no induration [de-identified] : Well developed, well nourished adult, in NAD. [de-identified] : NC/AT, PERRL, EOMI, sclera anicteric [de-identified] : Soft, nontender, nondistended. No masses, rebound or guarding. Lower midline incision well healed, without erythema or drainage.  No hernias palpable.  Umbilicus without erythema, drainage or skin changes.  Moderate upper diastasis recti. No abdominal wall hernia palpable. \par  [de-identified] : Affect appropriate

## 2021-08-11 NOTE — HISTORY OF PRESENT ILLNESS
[de-identified] : Patient underwent an emergency exlap for a strangulated ventral hernia in 2012, developed a post-operative wound infection but otherwise recovered well.  She has been in her usual state of good health and has been mindful of over-exerting herself despite remaining active. \par SHe had an episode of acute pancreatitis which resolved without sequelae.  She had a sonogram performed at that time which did not reveal any gallstones or other abnormality.  She saw Dr. Painting in consultation, and has requested that she undergo an MRI to evaluate for any structural abnormalities of her pancreas.\par She presently feels well, denies pain, fever or chills.  She has been having normal bowel movements and offers no additional complaints at this time.  She does report feeling some occasional bloating in her upper abdomen but denies any abdominal pain, nausea, vomiting, obstipation or constipation.\par

## 2021-08-11 NOTE — PLAN
[FreeTextEntry1] : She will follow up with the MRI as instructed and will follow up with Dr. Painting and me as needed.\par \par I again advised her to seek immediate medical attention with any recurrence of her symptoms, including abdominal pain, bloating, obstipation constipation nausea or vomiting.\par \par Her questions and concerns were addressed to her satisfaction.

## 2021-08-11 NOTE — ASSESSMENT
[FreeTextEntry1] : Patient with history of hernia repair, with moderate diastases recti, but no hernia recurrence.  The cause of her recent pancreatitis is undetermined at this time, but I do agree that she should undergo the MRI which I advised her to do as soon as possible.

## 2021-08-18 RX ORDER — ALPRAZOLAM 0.5 MG/1
0.5 TABLET ORAL
Qty: 4 | Refills: 0 | Status: ACTIVE | COMMUNITY
Start: 2021-08-18 | End: 1900-01-01

## 2021-08-25 ENCOUNTER — NON-APPOINTMENT (OUTPATIENT)
Age: 81
End: 2021-08-25

## 2021-09-28 ENCOUNTER — APPOINTMENT (OUTPATIENT)
Dept: GASTROENTEROLOGY | Facility: CLINIC | Age: 81
End: 2021-09-28
Payer: MEDICARE

## 2021-09-28 VITALS
SYSTOLIC BLOOD PRESSURE: 144 MMHG | DIASTOLIC BLOOD PRESSURE: 86 MMHG | BODY MASS INDEX: 23.99 KG/M2 | HEIGHT: 65 IN | WEIGHT: 144 LBS | TEMPERATURE: 97.7 F

## 2021-09-28 VITALS — RESPIRATION RATE: 12 BRPM | HEART RATE: 70 BPM

## 2021-09-28 PROCEDURE — 36415 COLL VENOUS BLD VENIPUNCTURE: CPT

## 2021-09-28 PROCEDURE — 99214 OFFICE O/P EST MOD 30 MIN: CPT | Mod: 25

## 2021-09-28 NOTE — ASSESSMENT
[FreeTextEntry1] : 81-year-old female status post pancreatitis, history of MGUS and renal insufficiency.  The recent MRI of the pancreas showed no abnormalities.  Likely her episode was due to hyperlipidemia she was on a keto diet at that point.  Denies any abdominal discomfort specifically no right upper quadrant discomfort.  With regards to the 2.6 cm indeterminate liver lesion, recent undergo MRI of the liver with Eovist at her next visit in 3 months.  Laboratory tests will be obtained today.

## 2021-09-28 NOTE — HISTORY OF PRESENT ILLNESS
[___ Month(s) Ago] : [unfilled] month(s) ago [Heartburn] : denies heartburn [Nausea] : denies nausea [Vomiting] : denies vomiting [Diarrhea] : denies diarrhea [Constipation] : denies constipation [Yellow Skin Or Eyes (Jaundice)] : denies jaundice [Abdominal Pain] : denies abdominal pain [Abdominal Swelling] : denies abdominal swelling [Rectal Pain] : denies rectal pain [_________] : Performed [unfilled] [Good Compliance] : good compliance with treatment [Good Tolerance] : good tolerance of treatment [Good Symptom Control] : good symptom control [Wt Gain ___ Lbs] : no recent weight gain [Wt Loss ___ Lbs] : no recent weight loss [GERD] : no gastroesophageal reflux disease [Hiatus Hernia] : no hiatus hernia [Peptic Ulcer Disease] : no peptic ulcer disease [Pancreatitis] : no pancreatitis [Cholelithiasis] : no cholelithiasis [Kidney Stone] : no kidney stone [Inflammatory Bowel Disease] : no inflammatory bowel disease [Irritable Bowel Syndrome] : no irritable bowel syndrome [Diverticulitis] : no diverticulitis [Alcohol Abuse] : no alcohol abuse [Malignancy] : no malignancy [Abdominal Surgery] : no abdominal surgery [Appendectomy] : no appendectomy [Cholecystectomy] : no cholecystectomy [de-identified] : MRI [de-identified] : Patient is a 81-year-old female with a history of MGUS followed at [facility].  She has a history of renal insufficiency will be seeing her nephrologist in November.  Episode of pancreatitis this summer at [facility].  Her lipase at that time was 3747.  The IgG4 was normal.  Recent MRCP at [facility] radiology showed unremarkable pancreas.  She did however have a 2.6 cm T2 hyperintense lesion in the right lobe of the liver.  Ill-defined there is no weight loss anorexia. She received the second dosage of the Pfizer Covid 19 vaccine 10 days ago. She states she will obtain the Shingrix vaccine in 4 weeks.  [de-identified] : 2.6 cm hyperintense t2 lesion in right lobe.

## 2021-09-29 LAB
ALBUMIN SERPL ELPH-MCNC: 4.6 G/DL
ALP BLD-CCNC: 117 U/L
ALT SERPL-CCNC: 9 U/L
ANION GAP SERPL CALC-SCNC: 13 MMOL/L
AST SERPL-CCNC: 19 U/L
BASOPHILS # BLD AUTO: 0.05 K/UL
BASOPHILS NFR BLD AUTO: 0.8 %
BILIRUB DIRECT SERPL-MCNC: 0.1 MG/DL
BILIRUB INDIRECT SERPL-MCNC: 0.2 MG/DL
BILIRUB SERPL-MCNC: 0.2 MG/DL
BUN SERPL-MCNC: 20 MG/DL
CALCIUM SERPL-MCNC: 9.3 MG/DL
CEA SERPL-MCNC: 2.4 NG/ML
CHLORIDE SERPL-SCNC: 106 MMOL/L
CHOLEST SERPL-MCNC: 161 MG/DL
CO2 SERPL-SCNC: 24 MMOL/L
CREAT SERPL-MCNC: 1 MG/DL
EOSINOPHIL # BLD AUTO: 0.37 K/UL
EOSINOPHIL NFR BLD AUTO: 5.7 %
FERRITIN SERPL-MCNC: 60 NG/ML
GGT SERPL-CCNC: 23 U/L
GLUCOSE SERPL-MCNC: 82 MG/DL
HCT VFR BLD CALC: 40.5 %
HDLC SERPL-MCNC: 61 MG/DL
HGB BLD-MCNC: 12.3 G/DL
IMM GRANULOCYTES NFR BLD AUTO: 0.3 %
IRON SATN MFR SERPL: 26 %
IRON SERPL-MCNC: 76 UG/DL
LDLC SERPL CALC-MCNC: 67 MG/DL
LYMPHOCYTES # BLD AUTO: 2 K/UL
LYMPHOCYTES NFR BLD AUTO: 30.8 %
MAN DIFF?: NORMAL
MCHC RBC-ENTMCNC: 29.7 PG
MCHC RBC-ENTMCNC: 30.4 GM/DL
MCV RBC AUTO: 97.8 FL
MONOCYTES # BLD AUTO: 0.46 K/UL
MONOCYTES NFR BLD AUTO: 7.1 %
NEUTROPHILS # BLD AUTO: 3.6 K/UL
NEUTROPHILS NFR BLD AUTO: 55.3 %
NONHDLC SERPL-MCNC: 100 MG/DL
PLATELET # BLD AUTO: 238 K/UL
POTASSIUM SERPL-SCNC: 4.5 MMOL/L
PROT SERPL-MCNC: 7 G/DL
RBC # BLD: 4.14 M/UL
RBC # FLD: 12.9 %
SODIUM SERPL-SCNC: 144 MMOL/L
TIBC SERPL-MCNC: 297 UG/DL
TRIGL SERPL-MCNC: 162 MG/DL
UIBC SERPL-MCNC: 221 UG/DL
WBC # FLD AUTO: 6.5 K/UL

## 2021-09-30 LAB
HAV IGM SER QL: NONREACTIVE
HBV CORE IGG+IGM SER QL: NONREACTIVE
HBV SURFACE AB SER QL: NONREACTIVE
HBV SURFACE AG SER QL: NONREACTIVE
HCV AB SER QL: NONREACTIVE
HCV S/CO RATIO: 0.13 S/CO
HEPATITIS A IGG ANTIBODY: NONREACTIVE
TTG IGA SER IA-ACNC: <1.2 U/ML
TTG IGA SER-ACNC: NEGATIVE
TTG IGG SER IA-ACNC: <1.2 U/ML
TTG IGG SER IA-ACNC: NEGATIVE

## 2021-10-01 LAB
ANA PAT FLD IF-IMP: ABNORMAL
ANA SER IF-ACNC: ABNORMAL

## 2021-10-02 LAB
ALPHA-1-FETOPROTEIN-L3: NORMAL %
ALPHA-1-FETOPROTEIN: 4.8 NG/ML

## 2021-10-06 ENCOUNTER — TRANSCRIPTION ENCOUNTER (OUTPATIENT)
Age: 81
End: 2021-10-06

## 2021-12-07 ENCOUNTER — APPOINTMENT (OUTPATIENT)
Dept: GASTROENTEROLOGY | Facility: CLINIC | Age: 81
End: 2021-12-07
Payer: MEDICARE

## 2021-12-07 VITALS — HEART RATE: 72 BPM | RESPIRATION RATE: 12 BRPM | HEIGHT: 65 IN | BODY MASS INDEX: 24.16 KG/M2 | WEIGHT: 145 LBS

## 2021-12-07 DIAGNOSIS — Z00.00 ENCOUNTER FOR GENERAL ADULT MEDICAL EXAMINATION W/OUT ABNORMAL FINDINGS: ICD-10-CM

## 2021-12-07 PROCEDURE — 99214 OFFICE O/P EST MOD 30 MIN: CPT | Mod: 25

## 2021-12-07 PROCEDURE — 36415 COLL VENOUS BLD VENIPUNCTURE: CPT

## 2021-12-07 NOTE — HISTORY OF PRESENT ILLNESS
[___ Month(s) Ago] : [unfilled] month(s) ago [Heartburn] : denies heartburn [Nausea] : denies nausea [Vomiting] : denies vomiting [Diarrhea] : denies diarrhea [Constipation] : denies constipation [Yellow Skin Or Eyes (Jaundice)] : denies jaundice [Abdominal Pain] : denies abdominal pain [Abdominal Swelling] : denies abdominal swelling [Rectal Pain] : denies rectal pain [_________] : Performed [unfilled] [Good Compliance] : good compliance with treatment [Good Tolerance] : good tolerance of treatment [Good Symptom Control] : good symptom control [Wt Gain ___ Lbs] : no recent weight gain [Wt Loss ___ Lbs] : no recent weight loss [GERD] : no gastroesophageal reflux disease [Hiatus Hernia] : no hiatus hernia [Peptic Ulcer Disease] : no peptic ulcer disease [Pancreatitis] : no pancreatitis [Cholelithiasis] : no cholelithiasis [Kidney Stone] : no kidney stone [Inflammatory Bowel Disease] : no inflammatory bowel disease [Irritable Bowel Syndrome] : no irritable bowel syndrome [Diverticulitis] : no diverticulitis [Alcohol Abuse] : no alcohol abuse [Malignancy] : no malignancy [Abdominal Surgery] : no abdominal surgery [Appendectomy] : no appendectomy [Cholecystectomy] : no cholecystectomy [de-identified] : MRI [de-identified] : Patient is a 81-year-old female with a history of MGUS followed at [facility].  She has a history of renal insufficiency will be seeing her nephrologist in November.  Episode of pancreatitis this summer at [facility].  Her lipase at that time was 3747.  The IgG4 was normal.  Recent MRCP at [facility] radiology showed unremarkable pancreas.  She did however have a 2.6 cm T2 hyperintense lesion in the right lobe of the liver.  Ill-defined there is no weight loss anorexia. She received the second dosage of the Pfizer Covid 19 vaccine 10 days ago. She states she will obtain the Shingrix vaccine in 4 weeks. \par \par \par 12/7/2021: MRI of the liver at [facility] radiology showed a hyperintense lesion in the right lobe of the liver measuring 2.6 x 1.5 cm.  MRI with Eovist was advised.  She otherwise feels well.  Reviewed her laboratory tests including CMP with a creatinine of 1.0. [de-identified] : 2.6 cm hyperintense t2 lesion in right lobe.

## 2021-12-07 NOTE — ASSESSMENT
[FreeTextEntry1] : 81-year-old female status post pancreatitis, history of MGUS and renal insufficiency.  The recent MRI of the pancreas showed no abnormalities.  Likely her episode was due to hyperlipidemia she was on a keto diet at that point.  Denies any abdominal discomfort specifically no right upper quadrant discomfort.  With regards to the 2.6 cm indeterminate liver lesion, recent undergo MRI of the liver with Eovist at her next visit in 3 months.  Laboratory tests will be obtained today.\par \par 12/7/2021: As I discussed with the patient, she may defer her MRI until January which better suture schedule.  Possibilities include FNH versus adenoma involving the right lobe of the liver.  She otherwise feels well and there is no abdominal discomfort.  She will proceed to MRI scheduling.

## 2021-12-08 LAB
ALBUMIN SERPL ELPH-MCNC: 4.7 G/DL
ALP BLD-CCNC: 113 U/L
ALT SERPL-CCNC: 13 U/L
ANION GAP SERPL CALC-SCNC: 13 MMOL/L
AST SERPL-CCNC: 18 U/L
BILIRUB SERPL-MCNC: 0.4 MG/DL
BUN SERPL-MCNC: 28 MG/DL
CALCIUM SERPL-MCNC: 9.1 MG/DL
CANCER AG19-9 SERPL-ACNC: 5 U/ML
CHLORIDE SERPL-SCNC: 106 MMOL/L
CO2 SERPL-SCNC: 22 MMOL/L
CREAT SERPL-MCNC: 1.21 MG/DL
GLUCOSE SERPL-MCNC: 85 MG/DL
LPL SERPL-CCNC: 38 U/L
POTASSIUM SERPL-SCNC: 5 MMOL/L
PROT SERPL-MCNC: 7.1 G/DL
SODIUM SERPL-SCNC: 142 MMOL/L

## 2021-12-15 ENCOUNTER — TRANSCRIPTION ENCOUNTER (OUTPATIENT)
Age: 81
End: 2021-12-15

## 2022-01-19 ENCOUNTER — NON-APPOINTMENT (OUTPATIENT)
Age: 82
End: 2022-01-19

## 2022-02-17 ENCOUNTER — APPOINTMENT (OUTPATIENT)
Dept: GASTROENTEROLOGY | Facility: CLINIC | Age: 82
End: 2022-02-17
Payer: MEDICARE

## 2022-02-17 VITALS — RESPIRATION RATE: 12 BRPM | HEIGHT: 65 IN | WEIGHT: 145 LBS | BODY MASS INDEX: 24.16 KG/M2 | HEART RATE: 70 BPM

## 2022-02-17 PROCEDURE — 99214 OFFICE O/P EST MOD 30 MIN: CPT

## 2022-02-17 RX ORDER — HYDROXYCHLOROQUINE SULFATE 200 MG/1
200 TABLET, FILM COATED ORAL
Refills: 0 | Status: ACTIVE | COMMUNITY

## 2022-02-17 NOTE — HISTORY OF PRESENT ILLNESS
[Heartburn] : denies heartburn [Nausea] : denies nausea [Vomiting] : denies vomiting [Diarrhea] : denies diarrhea [Constipation] : denies constipation [Yellow Skin Or Eyes (Jaundice)] : denies jaundice [Abdominal Pain] : denies abdominal pain [Abdominal Swelling] : denies abdominal swelling [_________] : Performed [unfilled] [de-identified] : 83 yo u female, hx of pancreatitis, with normal pancreas on MRI. Recent MRI,T2 lesion in right lobe on recent MRI liver with GADO; pancreas is normal.There has been no change in size of the lesion. Was started on hydroxycholoquine by rheumatologist and she is doing well. Renal function stable.  It appears the rheumatologist thinks he may have discoid lupus. [de-identified] : T2 enahced lesion in right lobe liver, normal pancreas

## 2022-02-17 NOTE — ASSESSMENT
[FreeTextEntry1] : 83 yo u female, hx of pancreatitis, with normal pancreas on MRI. Recent MRI,T2 lesion in right lobe on recent MRI liver with GADO; pancreas is normal.There has been no change in size of the lesion. Was started on hydroxycholoquine by rheumatologist and she is doing well. Renal function stable.\par \par Plan:\par \par 1. 6month followup for labs\par 2. MRI Eovist in 6months Pt agrees to continued monitor with MRI given stability. If lesion increases in size will proceed to sampling.

## 2022-02-18 ENCOUNTER — TRANSCRIPTION ENCOUNTER (OUTPATIENT)
Age: 82
End: 2022-02-18

## 2022-04-25 ENCOUNTER — RX RENEWAL (OUTPATIENT)
Age: 82
End: 2022-04-25

## 2022-08-01 ENCOUNTER — APPOINTMENT (OUTPATIENT)
Dept: GASTROENTEROLOGY | Facility: CLINIC | Age: 82
End: 2022-08-01

## 2022-08-01 VITALS
HEIGHT: 65 IN | RESPIRATION RATE: 12 BRPM | BODY MASS INDEX: 22.82 KG/M2 | SYSTOLIC BLOOD PRESSURE: 124 MMHG | DIASTOLIC BLOOD PRESSURE: 80 MMHG | HEART RATE: 72 BPM | WEIGHT: 137 LBS

## 2022-08-01 DIAGNOSIS — Z87.19 PERSONAL HISTORY OF OTHER DISEASES OF THE DIGESTIVE SYSTEM: ICD-10-CM

## 2022-08-01 PROCEDURE — 99214 OFFICE O/P EST MOD 30 MIN: CPT

## 2022-08-01 NOTE — HISTORY OF PRESENT ILLNESS
[Heartburn] : denies heartburn [Nausea] : denies nausea [Vomiting] : denies vomiting [Diarrhea] : denies diarrhea [Constipation] : denies constipation [Yellow Skin Or Eyes (Jaundice)] : denies jaundice [Abdominal Pain] : denies abdominal pain [Abdominal Swelling] : denies abdominal swelling [_________] : Performed [unfilled] [de-identified] : 81 yo u female, hx of pancreatitis, with normal pancreas on MRI. Recent MRI,T2 lesion in right lobe on recent MRI liver with GADO; pancreas is normal.There has been no change in size of the lesion. Was started on hydroxycholoquine by rheumatologist and she is doing well. Renal function stable.  It appears the rheumatologist thinks he may have discoid lupus.\par \par 8/1/22 returns today; Had diuretic stopped by renal, due elevated cr, which has returned to normal . Due to for followup MRI. [de-identified] : T2 enahced lesion in right lobe liver, normal pancreas

## 2022-08-01 NOTE — ASSESSMENT
[FreeTextEntry1] : 83 yo u female, hx of pancreatitis, with normal pancreas on MRI. Recent MRI,T2 lesion in right lobe on recent MRI liver with GADO; pancreas is normal.There has been no change in size of the lesion. Was started on hydroxycholoquine by rheumatologist and she is doing well. Renal function stable.  It appears the rheumatologist thinks he may have discoid lupus.\par \par 8/1/22 returns today; Had diuretic stopped by renal, due elevated cr, which has returned to normal . Due to for followup MRI.\par \par

## 2022-08-19 ENCOUNTER — NON-APPOINTMENT (OUTPATIENT)
Age: 82
End: 2022-08-19

## 2023-02-01 ENCOUNTER — RX RENEWAL (OUTPATIENT)
Age: 83
End: 2023-02-01

## 2023-04-06 ENCOUNTER — APPOINTMENT (OUTPATIENT)
Dept: GASTROENTEROLOGY | Facility: CLINIC | Age: 83
End: 2023-04-06
Payer: MEDICARE

## 2023-04-06 VITALS
HEART RATE: 55 BPM | WEIGHT: 144 LBS | TEMPERATURE: 97.8 F | SYSTOLIC BLOOD PRESSURE: 140 MMHG | DIASTOLIC BLOOD PRESSURE: 70 MMHG | RESPIRATION RATE: 12 BRPM | OXYGEN SATURATION: 95 % | HEIGHT: 65 IN | BODY MASS INDEX: 23.99 KG/M2

## 2023-04-06 DIAGNOSIS — D47.2 MONOCLONAL GAMMOPATHY: ICD-10-CM

## 2023-04-06 DIAGNOSIS — E07.9 DISORDER OF THYROID, UNSPECIFIED: ICD-10-CM

## 2023-04-06 PROCEDURE — 36415 COLL VENOUS BLD VENIPUNCTURE: CPT

## 2023-04-06 PROCEDURE — 99214 OFFICE O/P EST MOD 30 MIN: CPT | Mod: 25

## 2023-04-06 RX ORDER — LISINOPRIL 30 MG/1
TABLET ORAL
Refills: 0 | Status: DISCONTINUED | COMMUNITY
End: 2023-04-06

## 2023-04-06 RX ORDER — LOSARTAN POTASSIUM 100 MG/1
TABLET, FILM COATED ORAL
Refills: 0 | Status: ACTIVE | COMMUNITY

## 2023-04-06 NOTE — ASSESSMENT
[FreeTextEntry1] : 84 yo female, hx of pancreatitis, with right lobe liver lesion for follouwp. Pancreas normal. Currently on amlodipine and losartan for HTN. Gained weight, feels well.\par \par IMP:\par 1. liver nodule right lobe\par 2. HTN, well controlled\par \par PLAN:\par 1. MRI abd/liver at [facility] in [place] with Gadolinium\par 2. cmp, lipase, cea, afp, ca 19-9\par 3. 6month followup

## 2023-04-06 NOTE — PHYSICAL EXAM
[Alert] : alert [Normal Voice/Communication] : normal voice/communication [Healthy Appearing] : healthy appearing [No Acute Distress] : no acute distress [Sclera] : the sclera and conjunctiva were normal [Hearing Threshold Finger Rub Not [place]] : hearing was normal [Normal Lips/Gums] : the lips and gums were normal [Oropharynx] : the oropharynx was normal [Normal Appearance] : the appearance of the neck was normal [No Neck Mass] : no neck mass was observed [No Respiratory Distress] : no respiratory distress [No Acc Muscle Use] : no accessory muscle use [Respiration, Rhythm And Depth] : normal respiratory rhythm and effort [Auscultation Breath Sounds / Voice Sounds] : lungs were clear to auscultation bilaterally [Heart Rate And Rhythm] : heart rate was normal and rhythm regular [Normal S1, S2] : normal S1 and S2 [Murmurs] : no murmurs [Bowel Sounds] : normal bowel sounds [Abdomen Tenderness] : non-tender [No Masses] : no abdominal mass palpated [Abdomen Soft] : soft [] : no hepatosplenomegaly [Oriented To Time, Place, And Person] : oriented to person, place, and time

## 2023-04-15 ENCOUNTER — TRANSCRIPTION ENCOUNTER (OUTPATIENT)
Age: 83
End: 2023-04-15

## 2023-04-15 LAB
ALBUMIN MFR SERPL ELPH: 61.1 %
ALBUMIN SERPL ELPH-MCNC: 4.8 G/DL
ALBUMIN SERPL-MCNC: 4.3 G/DL
ALBUMIN/GLOB SERPL: 1.5 RATIO
ALP BLD-CCNC: 108 U/L
ALPHA-1-FETOPROTEIN-L3: NORMAL %
ALPHA-1-FETOPROTEIN: 4.3 NG/ML
ALPHA1 GLOB MFR SERPL ELPH: 5 %
ALPHA1 GLOB SERPL ELPH-MCNC: 0.4 G/DL
ALPHA2 GLOB MFR SERPL ELPH: 8.9 %
ALPHA2 GLOB SERPL ELPH-MCNC: 0.6 G/DL
ALT SERPL-CCNC: 17 U/L
ANION GAP SERPL CALC-SCNC: 16 MMOL/L
AST SERPL-CCNC: 22 U/L
B-GLOBULIN MFR SERPL ELPH: 14.1 %
B-GLOBULIN SERPL ELPH-MCNC: 1 G/DL
BASOPHILS # BLD AUTO: 0.06 K/UL
BASOPHILS NFR BLD AUTO: 0.8 %
BILIRUB SERPL-MCNC: 0.3 MG/DL
BUN SERPL-MCNC: 33 MG/DL
CALCIUM SERPL-MCNC: 9.7 MG/DL
CANCER AG19-9 SERPL-ACNC: 5 U/ML
CEA SERPL-MCNC: 2.6 NG/ML
CHLORIDE SERPL-SCNC: 104 MMOL/L
CO2 SERPL-SCNC: 22 MMOL/L
CREAT SERPL-MCNC: 1.18 MG/DL
DEPRECATED KAPPA LC FREE/LAMBDA SER: 0.29 RATIO
EGFR: 46 ML/MIN/1.73M2
EOSINOPHIL # BLD AUTO: 0.27 K/UL
EOSINOPHIL NFR BLD AUTO: 3.6 %
GAMMA GLOB FLD ELPH-MCNC: 0.8 G/DL
GAMMA GLOB MFR SERPL ELPH: 10.9 %
GLUCOSE SERPL-MCNC: 86 MG/DL
HCT VFR BLD CALC: 42.9 %
HGB BLD-MCNC: 13.3 G/DL
IGA SER QL IEP: 167 MG/DL
IGG SER QL IEP: 782 MG/DL
IGM SER QL IEP: 443 MG/DL
IMM GRANULOCYTES NFR BLD AUTO: 0.3 %
INTERPRETATION SERPL IEP-IMP: NORMAL
KAPPA LC CSF-MCNC: 11.57 MG/DL
KAPPA LC SERPL-MCNC: 3.34 MG/DL
LPL SERPL-CCNC: 55 U/L
LYMPHOCYTES # BLD AUTO: 1.88 K/UL
LYMPHOCYTES NFR BLD AUTO: 24.7 %
M PROTEIN MFR SERPL ELPH: 3.7 %
M PROTEIN SPEC IFE-MCNC: NORMAL
MAN DIFF?: NORMAL
MCHC RBC-ENTMCNC: 31 GM/DL
MCHC RBC-ENTMCNC: 31.3 PG
MCV RBC AUTO: 100.9 FL
MONOCLON BAND OBS SERPL: 0.3 G/DL
MONOCYTES # BLD AUTO: 0.58 K/UL
MONOCYTES NFR BLD AUTO: 7.6 %
NEUTROPHILS # BLD AUTO: 4.79 K/UL
NEUTROPHILS NFR BLD AUTO: 63 %
PLATELET # BLD AUTO: 232 K/UL
POTASSIUM SERPL-SCNC: 4.8 MMOL/L
PROT SERPL-MCNC: 7.1 G/DL
RBC # BLD: 4.25 M/UL
RBC # FLD: 13.2 %
SODIUM SERPL-SCNC: 142 MMOL/L
WBC # FLD AUTO: 7.6 K/UL

## 2023-05-19 ENCOUNTER — TRANSCRIPTION ENCOUNTER (OUTPATIENT)
Age: 83
End: 2023-05-19

## 2023-05-19 ENCOUNTER — NON-APPOINTMENT (OUTPATIENT)
Age: 83
End: 2023-05-19

## 2023-07-05 ENCOUNTER — APPOINTMENT (OUTPATIENT)
Dept: SURGERY | Facility: CLINIC | Age: 83
End: 2023-07-05
Payer: MEDICARE

## 2023-07-05 VITALS
RESPIRATION RATE: 16 BRPM | TEMPERATURE: 98.8 F | HEIGHT: 65 IN | OXYGEN SATURATION: 98 % | BODY MASS INDEX: 23.82 KG/M2 | SYSTOLIC BLOOD PRESSURE: 166 MMHG | WEIGHT: 143 LBS | DIASTOLIC BLOOD PRESSURE: 80 MMHG | HEART RATE: 74 BPM

## 2023-07-05 DIAGNOSIS — Z87.19 OTHER SPECIFIED POSTPROCEDURAL STATES: ICD-10-CM

## 2023-07-05 DIAGNOSIS — Z98.890 OTHER SPECIFIED POSTPROCEDURAL STATES: ICD-10-CM

## 2023-07-05 PROCEDURE — 99214 OFFICE O/P EST MOD 30 MIN: CPT

## 2023-07-05 NOTE — HISTORY OF PRESENT ILLNESS
[de-identified] : Patient underwent an emergency exlap for a strangulated ventral hernia in 2012, developed a post-operative wound infection but otherwise recovered well.  She has been in her usual state of good health.   SHe has been seeing Dr. Painting for her the findings on her liver,and had a recent MRI 5/2023 which did not show any acute pathology.  \par She has occasional constipation, and is taking Metamucil daily.  \par She presently feels well, denies pain, fever or chills.  She denies any abdominal pain, nausea, vomiting.  \par  [de-identified] : Patient reports no interval changes to medications, medical history or overall health status.\par

## 2023-07-05 NOTE — ASSESSMENT
[FreeTextEntry1] : Patient with history of hernia repair, with moderate diastases recti, and no hernia palpable on exam despite MRI finding.  She is overall doing well.

## 2023-07-05 NOTE — REVIEW OF SYSTEMS
[Fever] : no fever [Chills] : no chills [Feeling Poorly] : not feeling poorly [Feeling Tired] : not feeling tired [Sore Throat] : no sore throat [Hoarseness] : no hoarseness [Chest Pain] : no chest pain [Palpitations] : no palpitations [Shortness Of Breath] : no shortness of breath [Cough] : no cough [SOB on Exertion] : no shortness of breath during exertion [Abdominal Pain] : no abdominal pain [Vomiting] : no vomiting [Constipation] : no constipation [Diarrhea] : no diarrhea [Dysuria] : no dysuria [Incontinence] : no incontinence [As Noted in HPI] : as noted in HPI [Skin Lesions] : no skin lesions [Skin Wound] : no skin wound [Itching] : no itching [Negative] : Heme/Lymph

## 2023-07-05 NOTE — PHYSICAL EXAM
[Respiratory Effort] : normal respiratory effort [Normal Rate and Rhythm] : normal rate and rhythm [Abdominal Masses] : No abdominal masses [Abdomen Tenderness] : ~T ~M No abdominal tenderness [Skin Ulcer] : no ulcer [Skin Induration] : no induration [Alert] : alert [Oriented to Person] : oriented to person [Oriented to Place] : oriented to place [Oriented to Time] : oriented to time [de-identified] : Well developed, well nourished adult, in NAD. [de-identified] : NC/AT, PERRL, EOMI, sclera anicteric [de-identified] : Soft, nontender, nondistended. No masses, rebound or guarding. Lower midline incision well healed, without erythema or drainage.  No hernias palpable.  Umbilicus without erythema, drainage or skin changes.  Moderate upper diastasis recti. No abdominal wall hernia palpable. \par  [de-identified] : Affect appropriate

## 2023-07-05 NOTE — DATA REVIEWED
[FreeTextEntry1] : MRI dated 5/2023 showed diastasis recti and unchanged upper abdominal hernia containing fat.  No bowel pathology.

## 2023-10-10 ENCOUNTER — APPOINTMENT (OUTPATIENT)
Dept: GASTROENTEROLOGY | Facility: CLINIC | Age: 83
End: 2023-10-10
Payer: MEDICARE

## 2023-10-10 VITALS
DIASTOLIC BLOOD PRESSURE: 100 MMHG | HEART RATE: 58 BPM | OXYGEN SATURATION: 98 % | BODY MASS INDEX: 24.46 KG/M2 | SYSTOLIC BLOOD PRESSURE: 170 MMHG | WEIGHT: 147 LBS

## 2023-10-10 DIAGNOSIS — F41.9 ANXIETY DISORDER, UNSPECIFIED: ICD-10-CM

## 2023-10-10 DIAGNOSIS — N18.9 CHRONIC KIDNEY DISEASE, UNSPECIFIED: ICD-10-CM

## 2023-10-10 DIAGNOSIS — I10 ESSENTIAL (PRIMARY) HYPERTENSION: ICD-10-CM

## 2023-10-10 DIAGNOSIS — K76.89 OTHER SPECIFIED DISEASES OF LIVER: ICD-10-CM

## 2023-10-10 PROCEDURE — 99214 OFFICE O/P EST MOD 30 MIN: CPT

## 2023-11-27 NOTE — ED PROVIDER NOTE - CHPI ED SYMPTOMS POS
----- Message from Trista Crespo sent at 11/27/2023 10:19 AM CST -----  Refill for oxycodone, valium, gabapentin. She needs them  because her hand is hurting. Salome. Pt #     PAIN/VOMITING

## 2024-01-02 ENCOUNTER — RX RENEWAL (OUTPATIENT)
Age: 84
End: 2024-01-02

## 2024-01-02 RX ORDER — FAMOTIDINE 40 MG/1
40 TABLET, FILM COATED ORAL
Qty: 90 | Refills: 3 | Status: ACTIVE | COMMUNITY
Start: 2021-08-16 | End: 1900-01-01

## 2024-10-10 ENCOUNTER — APPOINTMENT (OUTPATIENT)
Dept: GASTROENTEROLOGY | Facility: CLINIC | Age: 84
End: 2024-10-10
Payer: MEDICARE

## 2024-10-10 VITALS
BODY MASS INDEX: 24.66 KG/M2 | WEIGHT: 148 LBS | OXYGEN SATURATION: 99 % | RESPIRATION RATE: 16 BRPM | HEIGHT: 65 IN | HEART RATE: 58 BPM | SYSTOLIC BLOOD PRESSURE: 146 MMHG | TEMPERATURE: 97 F | DIASTOLIC BLOOD PRESSURE: 86 MMHG

## 2024-10-10 DIAGNOSIS — F41.9 ANXIETY DISORDER, UNSPECIFIED: ICD-10-CM

## 2024-10-10 DIAGNOSIS — K76.89 OTHER SPECIFIED DISEASES OF LIVER: ICD-10-CM

## 2024-10-10 DIAGNOSIS — I10 ESSENTIAL (PRIMARY) HYPERTENSION: ICD-10-CM

## 2024-10-10 DIAGNOSIS — N18.9 CHRONIC KIDNEY DISEASE, UNSPECIFIED: ICD-10-CM

## 2024-10-10 PROCEDURE — 36415 COLL VENOUS BLD VENIPUNCTURE: CPT

## 2024-10-10 PROCEDURE — 99213 OFFICE O/P EST LOW 20 MIN: CPT

## 2024-10-11 ENCOUNTER — NON-APPOINTMENT (OUTPATIENT)
Age: 84
End: 2024-10-11

## 2024-10-11 LAB
ALBUMIN SERPL ELPH-MCNC: 4.8 G/DL
ALP BLD-CCNC: 100 U/L
ALT SERPL-CCNC: 9 U/L
ANION GAP SERPL CALC-SCNC: 15 MMOL/L
AST SERPL-CCNC: 19 U/L
BILIRUB SERPL-MCNC: 0.5 MG/DL
BUN SERPL-MCNC: 43 MG/DL
CALCIUM SERPL-MCNC: 9.6 MG/DL
CANCER AG19-9 SERPL-ACNC: 6 U/ML
CEA SERPL-MCNC: 2.7 NG/ML
CHLORIDE SERPL-SCNC: 108 MMOL/L
CO2 SERPL-SCNC: 21 MMOL/L
CREAT SERPL-MCNC: 1.7 MG/DL
EGFR: 30 ML/MIN/1.73M2
GLUCOSE SERPL-MCNC: 85 MG/DL
POTASSIUM SERPL-SCNC: 5.1 MMOL/L
PROT SERPL-MCNC: 7.5 G/DL
SODIUM SERPL-SCNC: 145 MMOL/L

## 2024-10-15 ENCOUNTER — NON-APPOINTMENT (OUTPATIENT)
Age: 84
End: 2024-10-15

## 2024-11-12 ENCOUNTER — RX RENEWAL (OUTPATIENT)
Age: 84
End: 2024-11-12

## 2024-12-25 PROBLEM — F10.90 ALCOHOL USE: Status: ACTIVE | Noted: 2020-04-01

## 2025-04-01 NOTE — DISCHARGE NOTE NURSING/CASE MANAGEMENT/SOCIAL WORK - NSCORESITESY/N_GEN_A_CORE_RD
----- Message from Leonid Mora MD sent at 4/1/2025  7:12 AM CDT -----  Biopsy is not completely diagnostic but suggests thyroid origin without clear signs of malignancy. Recommend f/u US in 6 months, f/u with me to review. Thank you   
Yes

## 2025-04-08 ENCOUNTER — APPOINTMENT (OUTPATIENT)
Dept: GASTROENTEROLOGY | Facility: CLINIC | Age: 85
End: 2025-04-08
Payer: MEDICARE

## 2025-04-08 VITALS
BODY MASS INDEX: 24.66 KG/M2 | HEART RATE: 60 BPM | RESPIRATION RATE: 16 BRPM | OXYGEN SATURATION: 99 % | SYSTOLIC BLOOD PRESSURE: 140 MMHG | WEIGHT: 148 LBS | HEIGHT: 65 IN | TEMPERATURE: 98 F | DIASTOLIC BLOOD PRESSURE: 82 MMHG

## 2025-04-08 DIAGNOSIS — F41.9 ANXIETY DISORDER, UNSPECIFIED: ICD-10-CM

## 2025-04-08 DIAGNOSIS — Z87.19 PERSONAL HISTORY OF OTHER DISEASES OF THE DIGESTIVE SYSTEM: ICD-10-CM

## 2025-04-08 PROCEDURE — 99213 OFFICE O/P EST LOW 20 MIN: CPT

## 2025-04-08 PROCEDURE — 36415 COLL VENOUS BLD VENIPUNCTURE: CPT

## 2025-04-09 LAB
ALBUMIN SERPL ELPH-MCNC: 4.6 G/DL
ALP BLD-CCNC: 100 U/L
ALT SERPL-CCNC: 10 U/L
ANION GAP SERPL CALC-SCNC: 14 MMOL/L
AST SERPL-CCNC: 20 U/L
BILIRUB SERPL-MCNC: 0.3 MG/DL
BUN SERPL-MCNC: 39 MG/DL
CALCIUM SERPL-MCNC: 9.5 MG/DL
CEA SERPL-MCNC: 3.2 NG/ML
CHLORIDE SERPL-SCNC: 108 MMOL/L
CO2 SERPL-SCNC: 21 MMOL/L
CREAT SERPL-MCNC: 1.68 MG/DL
EGFRCR SERPLBLD CKD-EPI 2021: 30 ML/MIN/1.73M2
GLUCOSE SERPL-MCNC: 95 MG/DL
LPL SERPL-CCNC: 54 U/L
POTASSIUM SERPL-SCNC: 4.7 MMOL/L
PROT SERPL-MCNC: 7 G/DL
SODIUM SERPL-SCNC: 143 MMOL/L

## 2025-04-10 LAB
ALBUMIN MFR SERPL ELPH: 62.7 %
ALBUMIN SERPL-MCNC: 4.4 G/DL
ALBUMIN/GLOB SERPL: 1.7 RATIO
ALPHA1 GLOB MFR SERPL ELPH: 4.4 %
ALPHA1 GLOB SERPL ELPH-MCNC: 0.3 G/DL
ALPHA2 GLOB MFR SERPL ELPH: 8.8 %
ALPHA2 GLOB SERPL ELPH-MCNC: 0.6 G/DL
B-GLOBULIN MFR SERPL ELPH: 12.7 %
B-GLOBULIN SERPL ELPH-MCNC: 0.9 G/DL
GAMMA GLOB FLD ELPH-MCNC: 0.8 G/DL
GAMMA GLOB MFR SERPL ELPH: 11.4 %
INTERPRETATION SERPL IEP-IMP: NORMAL
M PROTEIN MFR SERPL ELPH: 3.3 %
M PROTEIN SPEC IFE-MCNC: NORMAL
MONOCLON BAND OBS SERPL: 0.2 G/DL
PROT SERPL-MCNC: 7 G/DL
PROT SERPL-MCNC: 7 G/DL

## 2025-04-14 LAB
ALPHA-1-FETOPROTEIN-L3: NORMAL %
ALPHA-1-FETOPROTEIN: 3.7 NG/ML

## 2025-05-16 ENCOUNTER — NON-APPOINTMENT (OUTPATIENT)
Age: 85
End: 2025-05-16